# Patient Record
Sex: FEMALE | Race: WHITE | Employment: FULL TIME | URBAN - METROPOLITAN AREA
[De-identification: names, ages, dates, MRNs, and addresses within clinical notes are randomized per-mention and may not be internally consistent; named-entity substitution may affect disease eponyms.]

---

## 2021-05-19 ENCOUNTER — OFFICE VISIT (OUTPATIENT)
Dept: NEUROSURGERY | Age: 26
End: 2021-05-19
Payer: COMMERCIAL

## 2021-05-19 VITALS
HEART RATE: 89 BPM | WEIGHT: 135 LBS | OXYGEN SATURATION: 99 % | BODY MASS INDEX: 23.92 KG/M2 | SYSTOLIC BLOOD PRESSURE: 110 MMHG | HEIGHT: 63 IN | DIASTOLIC BLOOD PRESSURE: 62 MMHG

## 2021-05-19 DIAGNOSIS — G93.5 CHIARI I MALFORMATION (HCC): Primary | ICD-10-CM

## 2021-05-19 DIAGNOSIS — M54.16 LUMBAR RADICULOPATHY: ICD-10-CM

## 2021-05-19 PROCEDURE — 99204 OFFICE O/P NEW MOD 45 MIN: CPT | Performed by: NURSE PRACTITIONER

## 2021-05-19 RX ORDER — LORATADINE 10 MG/1
10 TABLET ORAL DAILY
COMMUNITY

## 2021-05-19 RX ORDER — CETIRIZINE HYDROCHLORIDE 10 MG/1
10 TABLET ORAL PRN
COMMUNITY

## 2021-05-19 RX ORDER — GABAPENTIN 300 MG/1
300 CAPSULE ORAL NIGHTLY
COMMUNITY
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
## 2021-07-22 ENCOUNTER — INITIAL CONSULT (OUTPATIENT)
Dept: PAIN MANAGEMENT | Age: 26
End: 2021-07-22
Payer: COMMERCIAL

## 2021-07-22 VITALS
BODY MASS INDEX: 23.05 KG/M2 | SYSTOLIC BLOOD PRESSURE: 129 MMHG | HEIGHT: 64 IN | WEIGHT: 135 LBS | DIASTOLIC BLOOD PRESSURE: 74 MMHG

## 2021-07-22 DIAGNOSIS — M54.16 LUMBAR RADICULOPATHY: Primary | ICD-10-CM

## 2021-07-22 PROCEDURE — 99204 OFFICE O/P NEW MOD 45 MIN: CPT | Performed by: PAIN MEDICINE

## 2021-07-22 RX ORDER — TIZANIDINE 4 MG/1
4 TABLET ORAL EVERY 6 HOURS PRN
COMMUNITY

## 2021-07-22 ASSESSMENT — ENCOUNTER SYMPTOMS
BOWEL INCONTINENCE: 0
BACK PAIN: 1
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
## 2021-08-11 ENCOUNTER — HOSPITAL ENCOUNTER (OUTPATIENT)
Dept: MRI IMAGING | Age: 26
Discharge: HOME | End: 2021-08-13
Payer: COMMERCIAL

## 2021-08-11 DIAGNOSIS — G93.5 CHIARI I MALFORMATION (HCC): ICD-10-CM

## 2021-08-11 PROCEDURE — 70551 MRI BRAIN STEM W/O DYE: CPT

## 2021-08-20 ENCOUNTER — OFFICE VISIT (OUTPATIENT)
Dept: NEUROSURGERY | Age: 26
End: 2021-08-20
Payer: COMMERCIAL

## 2021-08-20 VITALS
OXYGEN SATURATION: 99 % | HEART RATE: 73 BPM | DIASTOLIC BLOOD PRESSURE: 64 MMHG | HEIGHT: 64 IN | BODY MASS INDEX: 23.05 KG/M2 | SYSTOLIC BLOOD PRESSURE: 104 MMHG | WEIGHT: 135 LBS

## 2021-08-20 DIAGNOSIS — H53.8 BLURRED VISION: Primary | ICD-10-CM

## 2021-08-20 DIAGNOSIS — R51.9 INTRACTABLE EPISODIC HEADACHE, UNSPECIFIED HEADACHE TYPE: ICD-10-CM

## 2021-08-20 DIAGNOSIS — Q04.8 CEREBELLAR TONSILLAR ECTOPIA (HCC): ICD-10-CM

## 2021-08-20 PROCEDURE — 99214 OFFICE O/P EST MOD 30 MIN: CPT | Performed by: NURSE PRACTITIONER

## 2021-08-20 NOTE — PROGRESS NOTES
Dee Benjamin  [facility] [place] [address] [id number]  Dept: [phone]    Patient:  Rodrigo Ramos  YOB: 1995  Date: 21    The patient is a 32 y.o. female who presents today for consult of the following problems:     Chief Complaint   Patient presents with    Follow-up         HPI:     Rodrigo Ramos is a 32 y.o. female who presents for follow-up of headaches. Patient has had ongoing extensive evaluation for posterior/occipital headaches, was following with [facility] clinic for a period of time. Was having very severe headaches that were worsened with sneezing. Did undergo lumbar puncture, was found to have opening pressure of 11. Was evaluated by ophthalmologist, was told no abnormalities were identified. Did also have findings consistent with Chiari I malformation including 5 mm tonsillar descent. The only thing that had helped with her headaches was a trial of Diamox, which she is not on. Headaches have actually improved temporarily for period of time. Reports that she did recently have a worsening of headache approximately 2 weeks ago after attending a concert. Does report associated phonophobia, some less severe photophobia. Does have some occasional left-sided vision changes. Has not established with a local neurologist as of yet. Presents today for review of CSF flow study. Groin pain: No  Saddle anesthesia: No  Hip Pain: No  Bowel/bladder incontinence: No  Constipation or Urinary retention: No    LIMITATIONS    Pain significantly limiting from a daily standpoint. Assistive devices: none  Daily pharmacologic pain control include: gabapentin 300mg, zanaflex 4mg  Back versus le/50    MANAGEMENT     Prior Surgery: No  Prior to 1yr ago:   In the last year:    Physical Therapy: Yes-[facility]-12 weeks aquatic therapy   Chiropractic Interventions: No   Injections: No  Improvement: n/a    Types of injections/responses: n/a    History:     Past Medical History:   Diagnosis Date    Chiari I malformation ([address]     Low back pain      Past Surgical History:   Procedure Laterality Date    TONSILLECTOMY AND ADENOIDECTOMY       History reviewed. No pertinent family history. Current Outpatient Medications on File Prior to Visit   Medication Sig Dispense Refill    levonorgestrel (MIRENA, 52 MG,) IUD 52 mg 1 each by Intrauterine route once      tiZANidine (ZANAFLEX) 4 MG tablet Take 4 mg by mouth every 6 hours as needed      gabapentin (NEURONTIN) 300 MG capsule Take 300 mg by mouth nightly.  cetirizine (ZYRTEC) 10 MG tablet Take 10 mg by mouth as needed for Allergies      loratadine (CLARITIN) 10 MG tablet Take 10 mg by mouth daily (Patient not taking: Reported on 5/19/2021)       No current facility-administered medications on file prior to visit. Social History     Tobacco Use    Smoking status: Never Smoker    Smokeless tobacco: Never Used   Substance Use Topics    Alcohol use: Yes     Comment: rarely    Drug use: Never       Allergies   Allergen Reactions    Hydromorphone Nausea And Vomiting     Pt had Nausea /Vomitinglasted 2 days       Review of Systems  Constitutional: Negative for activity change and appetite change. HENT: Negative for ear pain and facial swelling. Eyes: Negative for discharge and itching. Respiratory: Negative for choking and chest tightness. Cardiovascular: Negative for chest pain and leg swelling. Gastrointestinal: Negative for nausea and abdominal pain. Endocrine: Negative for cold intolerance and heat intolerance. Genitourinary: Negative for frequency and flank pain. Musculoskeletal: Negative for myalgias and joint swelling. Skin: Negative for rash and wound. Allergic/Immunologic: Negative for environmental allergies and food allergies. Hematological: Negative for adenopathy. Does not bruise/bleed easily.    Psychiatric/Behavioral: Negative for self-injury. The patient is not nervous/anxious. Physical Exam:      /64   Pulse 73   Ht 5' 4\" (1.626 m)   Wt 135 lb (61.2 kg)   SpO2 99%   BMI 23.17 kg/m²   Estimated body mass index is 23.17 kg/m² as calculated from the following:    Height as of this encounter: 5' 4\" (1.626 m). Weight as of this encounter: 135 lb (61.2 kg). General:  Gvaiota Lockett is a 32y.o. year old female who appears her stated age. HEENT: Normocephalic atraumatic. Neck supple. Chest: regular rate; pulses equal  Abdomen: Soft nontender nondistended.    Ext: DP and PT pulses 2+, good cap refill  Neuro    Mentation  Appropriate affect  Registration intact  Orientation intact    Cranial Nerves:   Pupils equal and reactive to light  Extraocular motion intact  Face and shrug symmetric  Tongue midline  No dysarthria  v1-3 sensation symmetric, masseter tone symmetric  Hearing symmetric and intact    Sensation: Intact    Motor  L deltoid 5/5; R deltoid 5/5  L biceps 5/5; R biceps 5/5  L triceps 5/5; R triceps 5/5  L wrist extension 5/5; R wrist extension 5/5  L intrinsics 5/5; R intrinsics 5/5     L iliopsoas 5/5 , R iliopsoas 5/5  L quadriceps 5/5; R quadriceps 5/5  L Dorsiflexion 5/5; R dorsiflexion 5/5  L Plantarflexion 5/5; R plantarflexion 5/5  L EHL 5/5; R EHL 5/5    Reflexes  L Brachioradialis 2+/4; R brachioradialis 2+/4  L Biceps 2+/4; R Biceps 2+/4  L Triceps 2+/4; R Triceps 2+/4  L Patellar 2+/4: R Patellar 2+/4  L Achilles 2+/4; R Achilles 2+/4    hoffmans L: neg  hoffmans R: neg  Clonus L: neg  Clonus R: neg  Babinski L: neg  Babinski R: neg    MARIN: Negative  Straight leg raise: Positive  SI joint tenderness: Negative    Studies Review:     MRI CSF flow study 8/11/2021:  FINDINGS:   CSF flow images were obtained in the axial and sagittal planes.       There is normal to and fro CSF flow through the foramen of magnum, both   ventrally and dorsally.           MRI cervical spine 9/2/2020:  IMPRESSION:     Normal morphology and signal intensity of the visualized spinal cord.     Negative for syrinx involving cervical and thoracic spine.  CSF flow   study as discussed. Mild degenerative changes of the cervical spine as discussed.  No   significant spinal canal or significant foraminal stenosis throughout the   cervical and lumbar spine. Cervical Anatomic Variant:  None.  Assume 7 cervical vertebrae with   counting from the craniocervical junction. [place] clinic notes reviewed    Assessment and Plan:      1. Blurred vision    2. Intractable episodic headache, unspecified headache type    3. Cerebellar tonsillar ectopia (HCC)          Plan: Reviewed CSF flow study showing no evidence of obstruction. Given left sided vision changes, recommend new evaluation by neuro-ophthalmologist, referral provided. Also recommend that the patient establish with a local neurologist for further management of headaches, potentially atypical migraines. Patient would like to follow-up a little closer to home, so she will check with her insurance company to find covered neurologists close to her and notify this office with desired referral.  We will follow up in 4-6 weeks to ensure that she has established with the appropriate specialists. Continue to monitor symptoms in the interim. Followup: Return in about 6 weeks (around 10/1/2021), or if symptoms worsen or fail to improve. Prescriptions Ordered:  No orders of the defined types were placed in this encounter.        Orders Placed:  Orders Placed This Encounter   Procedures    External Referral To Ophthalmology     Referral Priority:   Routine     Referral Type:   Eval and Treat     Referral Reason:   Specialty Services Required     Referred to Provider:   Phyllis Johnson MD     Requested Specialty:   Ophthalmology     Number of Visits Requested:   1        Electronically signed by SHAYY Carbajal CNP on 8/20/2021 at 4:28 PM    Please note that this chart was generated using voice recognition Dragon dictation software. Although every effort was made to ensure the accuracy of this automated transcription, some errors in transcription may have occurred.

## 2023-11-29 ENCOUNTER — HOSPITAL ENCOUNTER
Dept: HOSPITAL 101 - LAB | Age: 28
Discharge: HOME | End: 2023-11-29
Payer: COMMERCIAL

## 2023-11-29 DIAGNOSIS — Z11.8: ICD-10-CM

## 2023-11-29 DIAGNOSIS — Z20.822: Primary | ICD-10-CM

## 2023-11-29 LAB — SARS-COV-2 AG: NEGATIVE

## 2023-11-29 PROCEDURE — 87635 SARS-COV-2 COVID-19 AMP PRB: CPT

## 2023-11-29 PROCEDURE — 87811 SARS-COV-2 COVID19 W/OPTIC: CPT

## 2023-11-29 PROCEDURE — 87804 INFLUENZA ASSAY W/OPTIC: CPT

## 2023-11-30 LAB — SARS-COV-2 NAA: NOT DETECTED

## 2024-08-07 ENCOUNTER — HOSPITAL ENCOUNTER (EMERGENCY)
Age: 29
Discharge: HOME | End: 2024-08-07
Payer: COMMERCIAL

## 2024-08-07 VITALS
DIASTOLIC BLOOD PRESSURE: 90 MMHG | SYSTOLIC BLOOD PRESSURE: 139 MMHG | HEART RATE: 90 BPM | OXYGEN SATURATION: 99 % | TEMPERATURE: 98.78 F

## 2024-08-07 VITALS — HEART RATE: 74 BPM | OXYGEN SATURATION: 99 %

## 2024-08-07 VITALS — BODY MASS INDEX: 26.9 KG/M2

## 2024-08-07 DIAGNOSIS — L03.022: Primary | ICD-10-CM

## 2024-08-07 PROCEDURE — 99284 EMERGENCY DEPT VISIT MOD MDM: CPT

## 2024-08-07 PROCEDURE — 96365 THER/PROPH/DIAG IV INF INIT: CPT

## 2024-08-07 NOTE — ED_ITS
<Statement entered by Fredy Wilkes MD - 08/07/24 19:00>    
This documentation has been reviewed and approved.    
    
Chart was sent to my inbox for administrative and group management purposes. I   
was the attending physicians working during the patients hospital course. The   
patient was seen and managed independently by the MLP.  I did not personally see  
or evaluate this patient, nor was I involved in the patient medical decision   
making process or plans of care. Pt was dispositioned by the MLP with complete   
independence and I was not involved in planning, and am reviewing this chart at   
a later date, at a point where changing the pts disposition and treatment would   
not be possible. I was available for consultation should the MLP request during   
this patients ED stay.    
    
HPI    
HPI - General Adult    
General    
Chief complaint: Extremity Problem, Nontraumatic    
Stated complaint: Upper Extremity Pain    
Time Seen by Provider: 08/07/24 17:27    
Source: patient    
Mode of arrival: walk-in    
History of Present Illness    
HPI narrative:     
29-year-old female presents to the emergency department with complaint of   
infection.  States onset this past Saturday to her left thumb and has been   
progressively worsening.  Now noticing a streak going up her arm.  Was evaluated  
by her provider today and sent in for IV antibiotics.  Patient states that there  
was an increasing painful, swollen area around her thumb which she was able to   
cope and get some drainage last night.  Denies any fever, chills, past medical   
problems.    
Quality:?as above    
Severity:?mild    
Timing:?as above, constant, worsening    
Context: Normal setting and activity?    
Modifying factors:?pain with palpation    
Associated symptoms: as above    
    
Related Data    
                                  Previous Rx's    
    
    
    
?Medication ?Instructions ?Recorded    
     
cephalexin 500 mg capsule 500 mg PO QID 10 days #40 caps 08/07/24    
    
    
    
                                    Allergies    
    
    
    
Allergy/AdvReac Type Severity Reaction Status Date / Time    
     
No Known Drug Allergies Allergy   Verified 08/07/24 17:25    
    
    
    
    
Opioid HPI    
Opioid Management    
Most Recent Opioid Data:     
    
    
                                        No Data to Display    
    
    
    
Review of Systems    
    
    
ROS      
    
 Constitutional Denies: fever, fatigue or malaise       
    
 Musculoskeletal Reports: extremity pain       
    
 Integumentary/Breast Reports: redness, skin tenderness and skin swelling       
    
 Neurological Denies: numbness in extremities or weakness in extremities       
    
 Endocrine Denies: fatigue or other (wound)       
    
    
Exam    
Constitutional    
Vital Signs, click to edit/add:     
    
                                Last Vital Signs    
    
    
    
Temp  98.8 F   08/07/24 17:22    
     
Pulse  74   08/07/24 18:32    
     
Resp  18   08/07/24 18:32    
     
BP  139/90   08/07/24 17:22    
     
Pulse Ox  99   08/07/24 18:32    
     
O2 Del Method  Room Air  08/07/24 18:32    
    
    
    
    
Documenting provider has reviewed patient's vital signs: yes    
Common normals: no apparent distress and oriented x3    
General appearance: well developed    
Cardio    
Peripheral pulses: radial pulses present left 2+    
Extremity    
Other:     
Left arm: +tenderness to the cuticle with associated swelling, consistent with   
paronychia.  No fluctuance is noted.  Swelling is firm.  She has lymphangitic   
streaking going up into the biceps region.? No tenderness to the remainder of   
the arm.? No ecchymosis, crepitus, deformity, instability, warmth.? ROM of the   
left upper extremity, hand, fingers is full.? Strength 5/5    
Neuro    
Common normals: oriented x3, no focal motor deficits and no sensory deficits   
noted    
Psych    
Common normals: mental status grossly normal and thought process normal    
Thought process: normal thought process    
    
Course    
Vital Signs    
Vital signs:     
    
                                   Vital Signs    
    
    
    
Temperature  98.8 F   08/07/24 17:22    
     
Pulse Rate  90   08/07/24 17:22    
     
Respiratory Rate  16   08/07/24 17:22    
     
Blood Pressure  139/90   08/07/24 17:22    
     
Pulse Oximetry  99   08/07/24 17:22    
     
Oxygen Delivery Method  Room Air  08/07/24 17:22    
    
    
                                            
    
    
    
Temperature  98.8 F   08/07/24 17:22    
     
Pulse Rate  74   08/07/24 18:32    
     
Respiratory Rate  18   08/07/24 18:32    
     
Blood Pressure  139/90   08/07/24 17:22    
     
Pulse Oximetry  99   08/07/24 18:32    
     
Oxygen Delivery Method  Room Air  08/07/24 18:32    
    
    
    
    
    
Medical Decision Making    
[facility] Narrative    
Medical decision making narrative:     
This is a pleasant 29-year-old female who presents to the emergency department   
with concern about infection    
On arrival, afebrile, vital signs are stable.    
Exam, nontoxic, well-appearing patient in no distress.  She has paronychia to   
her left thumb with erythema, streaking consistent with lymphangitis going up   
into the biceps region.  Neurovascularly intact.  No motor deficits noted    
    
Favor paronychia with lymphangitis    
Abscess less likely based on history and physical exam    
    
Patient was given IV dose of Ancef, 2 g    
    
    
Disposition    
?    
The patient was discharged.    
    
Plan: Patient will be discharged to home.  Condition at time of disposition:   
stable    
Prescription for Keflex sent to her pharmacy    
Advised to follow up with primary provider.   Advised to return for any   
worsening and/or development of new, concerning signs or symptoms    
    
    
PLEASE NOTE: Portions of the medical record may have been produced using   
electronic transcription and may contain errors with respect to translation of   
words which may not have been identified prior to finalization of the chart.    
Medical Records    
Medical records reviewed: Yes I reviewed the patient's medical records    
    
Discharge Plan    
Discharge    
Stand Alone Forms:  Portal Instructions    
    
Chief Complaint: Extremity Problem, Nontraumatic    
    
Clinical Impression:    
 Paronychia of left thumb, Lymphangitis    
    
    
Patient Disposition: Home, Self-Care    
    
Time of Disposition Decision: 18:28    
    
Condition: Good    
    
Mode of Transportation: Private Vehicle    
    
Prescriptions / Home Meds:    
New    
  cephalexin 500 mg capsule     
   500 mg PO QID 10 Days Qty: 40 0RF    
    
Print Language: English    
    
Instructions:  Paronychia (ED), Lymphangitis (ED)    
    
Referrals:    
Sandy Quintanilla NP [Primary Care Provider] - 1 week    
    
Discharge Date/Time: 08/07/24 18:33

## 2024-08-07 NOTE — ED.GENADUL1
HPI
HPI - General Adult
General
Chief complaint: Extremity Problem, Nontraumatic
Stated complaint: Upper Extremity Pain
Time Seen by Provider: 08/07/24 17:27
Source: patient
Mode of arrival: walk-in
History of Present Illness
HPI narrative: 
29-year-old female presents to the emergency department with complaint of infection.  States onset this past Saturday to her left thumb and has been progressively worsening.  Now noticing a streak going up her arm.  Was evaluated by her provider 
today and sent in for IV antibiotics.  Patient states that there was an increasing painful, swollen area around her thumb which she was able to cope and get some drainage last night.  Denies any fever, chills, past medical problems.
Quality:?as above
Severity:?mild
Timing:?as above, constant, worsening
Context: Normal setting and activity?
Modifying factors:?pain with palpation
Associated symptoms: as above

Related Data
Previous Rx's

?Medication ?Instructions ?Recorded
cephalexin 500 mg capsule 500 mg PO QID 10 days #40 caps 08/07/24


Allergies

Allergy/AdvReac Type Severity Reaction Status Date / Time
No Known Drug Allergies Allergy   Verified 08/07/24 17:25



Opioid HPI
Opioid Management
Most Recent Opioid Data: 
      No Data to Display


Review of Systems
ROS  
 Constitutional Denies: fever, fatigue or malaise   
 Musculoskeletal Reports: extremity pain   
 Integumentary/Breast Reports: redness, skin tenderness and skin swelling   
 Neurological Denies: numbness in extremities or weakness in extremities   
 Endocrine Denies: fatigue or other (wound)   

Exam
Constitutional
Vital Signs, click to edit/add: 

Last Vital Signs

Temp  98.8 F   08/07/24 17:22
Pulse  74   08/07/24 18:32
Resp  18   08/07/24 18:32
BP  139/90   08/07/24 17:22
Pulse Ox  99   08/07/24 18:32
O2 Del Method  Room Air  08/07/24 18:32



Documenting provider has reviewed patient's vital signs: yes
Common normals: no apparent distress and oriented x3
General appearance: well developed
Cardio
Peripheral pulses: radial pulses present left 2+
Extremity
Other: 
Left arm: +tenderness to the cuticle with associated swelling, consistent with paronychia.  No fluctuance is noted.  Swelling is firm.  She has lymphangitic streaking going up into the biceps region.? No tenderness to the remainder of the arm.? No 
ecchymosis, crepitus, deformity, instability, warmth.? ROM of the left upper extremity, hand, fingers is full.? Strength 5/5
Neuro
Common normals: oriented x3, no focal motor deficits and no sensory deficits noted
Psych
Common normals: mental status grossly normal and thought process normal
Thought process: normal thought process

Course
Vital Signs
Vital signs: 

Vital Signs

Temperature  98.8 F   08/07/24 17:22
Pulse Rate  90   08/07/24 17:22
Respiratory Rate  16   08/07/24 17:22
Blood Pressure  139/90   08/07/24 17:22
Pulse Oximetry  99   08/07/24 17:22
Oxygen Delivery Method  Room Air  08/07/24 17:22



Temperature  98.8 F   08/07/24 17:22
Pulse Rate  74   08/07/24 18:32
Respiratory Rate  18   08/07/24 18:32
Blood Pressure  139/90   08/07/24 17:22
Pulse Oximetry  99   08/07/24 18:32
Oxygen Delivery Method  Room Air  08/07/24 18:32




Medical Decision Making
[facility] Narrative
Medical decision making narrative: 
This is a pleasant 29-year-old female who presents to the emergency department with concern about infection
On arrival, afebrile, vital signs are stable.
Exam, nontoxic, well-appearing patient in no distress.  She has paronychia to her left thumb with erythema, streaking consistent with lymphangitis going up into the biceps region.  Neurovascularly intact.  No motor deficits noted

Favor paronychia with lymphangitis
Abscess less likely based on history and physical exam

Patient was given IV dose of Ancef, 2 g


Disposition
?
The patient was discharged.

Plan: Patient will be discharged to home.  Condition at time of disposition: stable
Prescription for Keflex sent to her pharmacy
Advised to follow up with primary provider.   Advised to return for any worsening and/or development of new, concerning signs or symptoms


PLEASE NOTE: Portions of the medical record may have been produced using electronic transcription and may contain errors with respect to translation of words which may not have been identified prior to finalization of the chart.
Medical Records
Medical records reviewed: Yes I reviewed the patient's medical records

Discharge Plan
Discharge
Stand Alone Forms:  Portal Instructions

Chief Complaint: Extremity Problem, Nontraumatic

Clinical Impression:
 Paronychia of left thumb, Lymphangitis


Patient Disposition: Home, Self-Care

Time of Disposition Decision: 18:28

Condition: Good

Mode of Transportation: Private Vehicle

Prescriptions / Home Meds:
New
  cephalexin 500 mg capsule 
   500 mg PO QID 10 Days Qty: 40 0RF

Print Language: English

Instructions:  Paronychia (ED), Lymphangitis (ED)

Referrals:
Sandy Quintanilla NP [Primary Care Provider] - 1 week

Discharge Date/Time: 08/07/24 18:33

## 2024-08-07 NOTE — XMS_ITS
Comprehensive CCD (C-CDA v2.1)  
  
                           Created on: 2024  
  
  
Lissett Ba  
External Reference #: CDR,PersonID:[id number]  
: 1995  
Sex: Female  
  
Demographics  
  
  
                                        Address             [address] [place]  [address]  
   
                                        Mobile Phone        [phone]  
   
                                        Preferred Language  en  
   
                                        Marital Status      Single  
   
                                        Restorationist Affiliation Unknown  
   
                                        Race                White  
   
                                        Ethnic Group        Not  or Lati  
no  
  
  
Author  
  
  
                                        Organization        [facility]  
  
  
Care Team Providers  
  
  
                                Care Team Member Name Role            Phone  
   
                                Sandy Quintanilla Primary Care Provider [phone]  
   
                                YAW GUPTA  Referring       Unavailable  
   
                                SANDY QUINTANILLA Primary Care    Unavailable  
   
                                AICHHOLKAMRYN, CNP SANDY Admitting       Unavailable  
   
                                AICHHOLZ, CNP SANYD Attending       Unavailable  
   
                                AICHHOLZ, CNP SANDY Primary Care    Unavailable  
   
                                AICHHOLZ, CNP SANDY Consulting      Unavailable  
   
                                AICHHOLZ, CNP SANDY Admitting       Unavailable  
   
                                AICHHOLZ, CNP SANDY Attending       Unavailable  
   
                                AICHHOLZ, CNP SANDY Primary Care    Unavailable  
   
                                AICHHOLZ, CNP SANDY Consulting      Unavailable  
   
                                Unavailable     Primary Care Provider Unavailabl  
e  
  
  
  
Allergies  
  
  
                                                    Allergy   
Classification                          Reported   
Allergen(s)               Allergy Type              Date of   
Onset                     Reaction(s)               Facility  
   
                                                    Opioid Agonists  
(1 source)          HYDROmorphone       Drug Allergy          
0                                       Nausea And   
Vomiting                                [facility]  
  
  
  
Medications  
Current Medications  
  
  
  
                      Medication Drug Class(es) Dates      Sig (Normalized) Sig   
(Original)  
   
                                                    cetirizine   
hydrochloride 10 mg   
oral tablet  
(1 source)                              Histamine-1   
Receptor Antagonist                                         cetirizine   
(ZYRTEC) 10 MG   
tablet Take 10 mg   
by mouth as   
needed for   
Allergies 0   
Active  
   
                                                    dextromethorphan   
hydrobromide 15 mg /   
guaiFENesin 400 mg /   
pseudoephedrine   
hydrochloride 60 mg   
oral tablet  
(1 source)                              alpha-Adrenergic   
Agonist,   
Uncompetitive   
N-methyl-D-aspartat  
e Receptor   
Antagonist, Sigma-1   
Agonist                                 Start:   
2024                              take 4 tablets by   
mouth every   
twenty-four hours                       Pseudoephedrine-D  
m-Guaifenesin   
(Capmist Dm)   
60- mg   
tablet Active 1   
TAB PO EVERY 4-6   
HOURS 30 May   
17th, 2024   
12:00am do not   
exceed 4 doses   
per 24 hrs  
   
                                                    gabapentin 300 mg oral   
capsule  
(1 source)                              Anti-epileptic   
Agent                                               take 1 capsule by   
mouth once daily                        gabapentin   
(NEURONTIN) 300   
MG capsule Take   
300 mg by mouth   
nightly. 0 Active  
   
                                                    loratadine 10 mg oral   
tablet  
(1 source)                                                  take 1 tablet by   
mouth once daily                        loratadine   
(CLARITIN) 10 MG   
tablet Take 10 mg   
by mouth daily 0   
Active  
   
                                                    tiZANidine 4 mg oral   
tablet  
(1 source)                              Central alpha-2   
Adrenergic Agonist                                  take 1 tablet by   
mouth every six   
hours as needed                         tiZANidine   
(ZANAFLEX) 4 MG   
tablet Take 4 mg   
by mouth every 6   
hours as needed 0   
Active  
   
                                                    valACYclovir 1000 mg   
oral tablet  
(1 source)                              Herpesvirus   
Nucleoside Analog   
DNA Polymerase   
Inhibitor, Herpes   
Simplex Virus   
Nucleoside Analog   
DNA Polymerase   
Inhibitor, Herpes   
Zoster Virus   
Nucleoside Analog   
DNA Polymerase   
Inhibitor                               Start:   
02-  
End:   
2024                              take 2 tablets by   
mouth in the   
morning                                 valACYclovir   
(Valtrex) 1 g   
tablet   
Indications: Cold   
sore Take 2   
tablets (2,000   
mg) by mouth in   
the morning and 2   
tablets (2,000   
mg) before   
bedtime. Do all   
this for 1 day. 4   
tablet 2   
02/15/2024   
2024 Active  
  
  
  
Completed/Discontinued Medications  
  
  
  
                      Medication Drug Class(es) Dates      Sig (Normalized) Sig   
(Original)  
   
                                                    acetaZOLAMIDE 250 mg   
oral tablet  
(1 source)                              Carbonic Anhydrase   
Inhibitor                               Start:   
2020                              take 1 tablet by   
mouth twice daily                       acetaZOLAMIDE   
(DIAMOX) 250 mg   
tablet Take 1   
tablet by mouth   
twice daily. 60   
tablet 1 2020   
Active  
   
                                        Comment on above:   Take 1 tablet by shawna  
th twice daily.   
   
                                                    cephalexin 500 mg   
oral capsule  
(1 source)                              Cephalosporin   
Antibacterial                                       take 1 capsule by   
mouth once daily                        cephALEXin (KEFLEX)   
500 mg capsule Take   
500 mg by mouth   
once daily. 0   
Active  
   
                                        Comment on above:   Take 500 mg by mouth  
 once daily.   
   
                                                    ketorolac   
tromethamine 10 mg   
oral tablet  
(1 source)                              Nonsteroidal   
Anti-inflammatory   
Drug,   
Cyclooxygenase   
Inhibitor                                           take 1 tablet by   
mouth every eight   
hours as needed                         ketorolac (TORADOL)   
10 mg tablet Take   
10 mg by mouth   
every 8 hours as   
needed. 0 Active  
   
                                        Comment on above:   Take 10 mg by mouth   
every 8 hours as needed.   
   
                                                    ondansetron 4 mg   
disintegrating oral   
tablet  
(1 source)                              Serotonin-3   
Receptor Antagonist                                 take 1 tablet by   
mouth every eight   
hours as needed                         ondansetron orally   
disintegrating   
(ZOFRAN ODT) 4 mg   
disintegrating   
tablet Take 4 mg by   
mouth every 8 hours   
as needed. 0 Active  
   
                                        Comment on above:   Take 4 mg by mouth e  
very 8 hours as needed.   
   
                                                    24 hr oxybutynin   
chloride 10 mg   
extended release   
oral tablet  
(1 source)                              Cholinergic   
Muscarinic   
Antagonist                                          take 1 tablet by   
mouth once daily                        oxybutynin ER   
(DITROPAN XL) 10 mg   
24 hr tablet Take   
10 mg by mouth once   
daily. 0 Active  
   
                                        Comment on above:   Take 10 mg by mouth   
once daily.   
  
  
  
Problems  
Active Problems  
  
  
                      Problem Classification Problem    Date       Documented Da  
te Episodic/Chronic  
   
                                                    Other nervous system   
disorders  
(1 source)                              Benign intracranial   
hypertension;   
Translations:   
[Benign   
intracranial   
hypertension]                                               Chronic  
   
                                                    Other nervous system   
disorders  
(2 sources)                             Chiari malformation   
type I;   
Translations:   
[Compression of   
brain]                                                      Chronic  
   
                                                    Other upper   
respiratory infections  
(1 source)                              Acute pharyngitis,   
unspecified;   
Translations:   
[Acute pharyngitis]                     2024          Episodic  
   
                                                    Unclassified  
(3 sources)                             CONTACT W/AND   
(SUSP) EXPOS   
COVID-19;   
Translations:   
[CONTACT W/AND   
(SUSP) EXPOS   
COVID-19]                               Onset:   
2022                                            
   
                                                    Unclassified  
(1 source)                              Chiari   
malformation;   
Translations:   
[Chiari   
malformation]                           2024            
   
                                                    Viral infection  
(2 sources)                             Herpes labialis;   
Translations:   
[Herpesviral   
vesicular   
dermatitis]                             Onset:   
02-                02-                Episodic  
   
                                                    Viral infection  
(1 source)                              COVID-19;   
Translations:   
[COVID-19]                              Onset:   
2021                                            
  
  
Past or Other Problems  
  
  
                      Problem Classification Problem    Date       Documented Da  
te Episodic/Chronic  
   
                                                    Unclassified  
(1 source)                              CONTACT W/AND   
(SUSP) EXPOS   
COVID-19;   
Translations:   
[CONTACT W/AND   
(SUSP) EXPOS   
COVID-19]                               Onset:   
2022                                            
  
  
  
Results  
  
  
                          Test Name    Value        Interpretation Reference   
Range                                   Facility  
   
                                                    No Panel InformationOrdered   
By: Diane Hernandez on 2024   
   
                      Quick Strep (POC)                                  [facility]  
   
                                                    Covid-19 PCR ([facility])on    
   
                                                    SARS-CoV-2 (COVID-19)   
RNA CLIVE+probe Ql (Unsp   
spec)           Not detected    Normal          NOT DETECTED    [facility]  
   
                                        Comment on above:   Result Comment: This  
 test is not yet approved or cleared by   
the   
[place] FDA. When there are no FDA-approved or cleared   
tests available, and other criteria are met, FDA can make tests   
available under an emergency access mechanism called an   
Emergency Use Authorization (EUA). The EUA for this test is   
supported by the [place] of Health and Human Service's   
(HHS's) declaration that circumstances exist to justify the   
emergency use of in vitro diagnostics for the detection and/or   
diagnosis of the virus that causes COVID-19. This EUA will   
remain in effect (meaning this test can be used) for the   
duration of the COVID-19 declaration justifying emergency of   
IVDs, unless it is terminated or revoked by FDA (after which   
the test may no longer be used).  
When diagnostic testing is negative, the possibility of a false   
negative should be considered in  
the context of a patient's recent exposures and the presence of   
clinical signs and symptoms  
consistent with SARS-CoV-2.   
   
                                                            Performed By: #### C  
VDTB ####  
[facility]  
[address] [place] [address]  
Dr. Jennifer Porras   
   
                                                    Covid-19 PCR ([facility])on    
   
                                                    SARS-CoV-2 (COVID-19)   
RNA CLIVE+probe Ql (Unsp   
spec)                     Detected                  Critically   
abnormal                  NOT DETECTED              [facility]  
   
                                        Comment on above:   Result Comment: This  
 test is not yet approved or cleared by   
the   
[place] FDA. When there are no FDA-approved or cleared   
tests available, and other criteria are met, FDA can make tests   
available under an emergency access mechanism called an   
Emergency Use Authorization (EUA). The EUA for this test is   
supported by the [place] of Health and Human Service's   
(HHS's) declaration that circumstances exist to justify the   
emergency use of in vitro diagnostics for the detection and/or   
diagnosis of the virus that causes COVID-19. This EUA will   
remain in effect (meaning this test can be used) for the   
duration of the COVID-19 declaration justifying emergency of   
IVDs, unless it is terminated or revoked by FDA (after which   
the test may no longer be used).   
   
                                                            Performed By: #### C  
VDTB ####  
[facility]  
[address] [place] [address]  
Dr. Jennifer Porras   
   
                                                    MRI CSF FLOW STUDYon   
021   
   
                                        MRI CSF FLOW STUDY  EXAMINATION:  
MRI CSF flow study   
2021 1:15 pm  
TECHNIQUE:  
Sagittal and axial T2   
flow  
COMPARISON:  
None.  
HISTORY:  
ORDERING SYSTEM   
PROVIDED HISTORY:   
Chiari I malformation   
(HCC)  
TECHNOLOGIST PROVIDED   
HISTORY:  
evaluate flow; +chiari   
malformation  
Is the patient   
pregnant?->No  
Reason for Exam: Pt   
c/o headaches,   
vertigo, ringing in   
ears, x 2 years,  
heaviness in back of   
head, mother with   
Chiari  
Acuity: Chronic  
Type of Exam: Ongoing  
FINDINGS:  
CSF flow images were   
obtained in the axial   
and sagittal planes.  
There is normal to and   
fro CSF flow through   
the foramen of magnum,   
both  
ventrally and   
dorsally.  
IMPRESSION:  
Normal CSF flow.  
Interpreted by:  
Malcolm Villeda MD  
Signed by:  
Malcolm Villeda MD  
21  
Final result        Normal                                  [facility]  
   
                                                    MRI CSF FLOW STUDYOrdered By  
: Yaw Gupta on 2021   
   
                                 Normal CSF flow.                       [facility]  
Work Phone:   
[phone]  
   
                                                            EXAMINATION: MRI CSF  
   
flow study 2021   
1:15 pm TECHNIQUE:   
Sagittal and axial T2   
flow COMPARISON: None.   
HISTORY: ORDERING   
SYSTEM PROVIDED   
HISTORY: Chiari I   
malformation ([facility])   
TECHNOLOGIST PROVIDED   
HISTORY: evaluate   
flow; +chiari   
malformation Is the   
patient pregnant?->No   
Reason for Exam: Pt   
c/o headaches,   
vertigo, ringing in   
ears, x 2 years,   
heaviness in back of   
head, mother with   
Chiari Acuity: Chronic   
Type of Exam: Ongoing   
FINDINGS: CSF flow   
images were obtained   
in the axial and   
sagittal planes. There   
is normal to and fro   
CSF flow through the   
foramen of magnum,   
both ventrally and   
dorsally.                                                   [employer]  
Work Phone:   
[phone]  
   
                                                            [place], p[place] Incoming   
Radiant Results From   
[employer] [facility] -   
2021 5:00 PM EDT   
Formatting of this   
note might be   
different from the   
original.  
EXAMINATION:  
MRI CSF flow study   
2021 1:15 pm  
  
TECHNIQUE:  
Sagittal and axial T2   
flow  
  
COMPARISON:  
None.  
  
HISTORY:  
ORDERING SYSTEM   
PROVIDED HISTORY:   
Chiari I malformation   
(HCC)  
TECHNOLOGIST PROVIDED   
HISTORY:  
evaluate flow; +chiari   
malformation  
Is the patient   
pregnant?->No  
Reason for Exam: Pt   
c/o headaches,   
vertigo, ringing in   
ears, x 2 years,  
heaviness in back of   
head, mother with   
Chiari  
Acuity: Chronic  
Type of Exam: Ongoing  
  
FINDINGS:  
CSF flow images were   
obtained in the axial   
and sagittal planes.  
  
There is normal to and   
fro CSF flow through   
the foramen of magnum,   
both  
ventrally and   
dorsally.  
  
IMPRESSION:  
Normal CSF flow.  
                                                            [employer]  
Work Phone:   
[phone]  
   
                                                                  [employer]  
Work Phone:   
[phone]  
   
                                                    Provider Letteron 2021  
   
   
                                        Provider Letter     (Inserted Image.   
Unable to display)  
2021  
LISSETT BA  
[address] [address]  
LISSETT BA   
1995  
Dear Lissett ,  
We have been trying to   
reach you since   
 with no   
success. You have an   
appointment with Dr. Uribe on  @   
10:15am which will   
need to be rescheduled   
since he will be out   
of the office that   
day. Please contact   
the office at the   
number listed below to   
get this appointment   
rescheduled at your   
earliest convenience.  
Thank you for your   
prompt attention to   
this matter. Call   
[phone] and   
choose option 1.  
Sincerely,  
Dr Christian Uribe MD  
[facility]  
[address] [place] [address] [facility]   
[place] [address]  
Phone: [phone] [facility]  
   
                                                    RAD - CT Reporton 10-  
   
   
                                        RAD - CT Report     [id number]  
80  
[id number]  
[id number]             [facility]  
   
                                                    Ambulatory Clinical Summaryo  
n 2020   
   
                                                    Ambulatory Clinical   
Summary                                 {[id number]  
[id number]  
[id number]      [facility]  
   
                                                    Consent for Procedure/Surger  
yon 2020   
   
                                                    Consent for   
Procedure/Surgery                       [id number]  
[id number]  
[id number]             [facility]  
   
                                                    Patient Educationon 20  
20   
   
                                        Patient Education   Cystoscopy  
Care After  
Refer to this sheet in   
the next few weeks.   
These instructions   
provide you with   
information on caring   
for yourself after   
your procedure. Your   
caregiver may also   
give you more specific   
instructions. Your   
treatment has been   
planned according to   
current medical   
practices, but   
problems sometimes   
occur. Call your   
caregiver if you have   
any problems or   
questions after your   
procedure.  
(Inserted Image.   
Unable to display)  
HOME CARE INSTRUCTIONS  
Things you can do to   
ease any discomfort   
after your procedure   
include:  
? Drinking enough   
water and fluids to   
keep your urine clear   
or pale yellow.  
? Taking a warm bath   
to relieve any burning   
feelings.  
SEEK IMMEDIATE MEDICAL   
CARE IF:  
? You have an increase   
in blood in your   
urine.  
? You notice blood   
clots in your urine.  
? You have difficulty   
passing urine.  
? You have the chills.  
? You have abdominal   
pain.  
? You have a fever or   
persistent symptoms   
for more than 2?3   
days.  
? You have a fever and   
your symptoms suddenly   
get worse.  
MAKE SURE YOU:  
? Understand these   
instructions.  
? Will watch your   
condition.  
? Will get help right   
away if you are not   
doing well or get   
worse.  
Document Released:   
2006 Document   
Revised: 2013   
Document Reviewed:   
06/10/2013  
ExitCare? Patient   
Information ?   
[employer]      [facility]  
   
                                                    Urology Office/Clinic Noteon  
 2020   
   
                                                    Urology Office/Clinic   
Note                                    Chief Complaint  
Patient is here for   
Cysto, right stent   
removal.  
HPI Staff  
Patient is here for   
Cysto, right stent   
removal. ABX started.   
Scope #1.  
History of Present   
Illness  
I have reviewed and   
verified the staff HPI   
to be accurate for   
this encounter.  
Review of Systems  
PHQ Score  
Initial Depression   
Screen Score: 0  
Physical Exam  
Vitals & Measurements  
HR: 97(Peripheral) RR:   
16 BP: 119/70  
HT: 160 cm HT: 160.0   
cm WT: 65.1 kg WT:   
65.1 kg BMI: 25.43  
Procedure  
Operative Information  
Anesthesia Type: Local  
Procedure: Local   
Cystoscopy with Stent   
Removal  
Complications: None  
Surgical risks,   
benefits, details of   
the procedure have   
been explained to the   
patient.  
Full informed consent   
has been obtained.  
Intraoperative   
Information  
Prepped: Patient is   
placed in modified   
dorso/lithotomy   
position. The patient   
was prepped with the   
Betadine solution.  
Anesthesia: 2%   
Xylocaine Jelly per   
urethra.  
Procedure: Cystoscopy   
and right stent   
removal. The flexible   
Cystoscope was passed   
in retrograde fashion   
into the bladder   
without difficulty.   
The bladder was viewed   
in entirety and found   
to be without tumors   
or stones. Mild   
inflammation was seen   
surrounding the   
orifice with the stent   
seen protruding from   
it. The stent was then   
grasped and removed in   
its entirety.  
Specimens Removed:   
None  
Postoperative   
Information  
The patient tolerated   
the procedure well and   
was subsequently   
discharged home.  
Assessment/Plan  
1. Foreign body of   
bladder (T19.1XXA:   
Foreign body in   
bladder, initial   
encounter)  
Follow-up  
With When Contact   
Information  
Rony Marino MD, [facility]   
In 6 months [address], [place] [address]- [phone]  
Additional   
Instructions: w/kub  
Patient Education  
Cystoscopy, Care After  
I, Nile Johnson,   
personally scribed for   
Dr. Madrigal on   
2020 08:29:21.   
Electronically signed   
by don Johnson on   
2020 08:29:21.  
Documentation recorded   
by the scribe, Nile Johnson,   
accurately reflects   
the services(s) I   
performed and   
decisions made by me.   
Authenticated by Dr. Madrigal on 2020   
08:34:25.  
  
Problem List/Past   
Medical History  
Ongoing  
BMI 26.0-26.9,adult  
History of Chiari   
malformation  
Kidney stone  
Historical  
No qualifying data  
Procedure/Surgical   
History  
Cystoscopy   
(2020),   
Adenoidectomy, Lumbar   
puncture,   
Tonsillectomy.  
Medications  
Cipro, Oral, q12hr,   
Not taking  
Flomax 0.4 mg Cap, 0.4   
mg= 1 cap(s), Oral,   
Daily, 1 refills, Not   
taking  
ketorolac 10 mg Tab,   
10 mg= 1 tab(s), Oral,   
q4hr, PRN, Not taking  
Levsin 0.125 mg oral   
tablet, 0.125 mg= 1   
tab(s), Oral, q6hr, 1   
refills  
Percocet 5 mg-325 mg   
oral tablet, Oral,   
q6hr, Not taking  
Zofran 4 mg Tab, Oral,   
q8hr, Not taking  
Zyrtec-D, Oral, BID  
Allergies  
No Known Allergies  
Social History  
Alcohol - Denies   
Alcohol Use,   
2020  
Substance Abuse -   
Denies Substance   
Abuse, 2020  
Tobacco - No Risk,   
2020  
Never (less than 100   
in lifetime) Tobacco   
Use:. Never Smokeless   
Tobacco Use:.,   
2020  
Family History  
Breast: Grandparent.  
Colon cancer:   
Grandparent.  
Hypertension: Sister.  
Migraine: Sister.   Normal                                  [facility]  
   
                                        Comment on above:   Result Comment: Elec  
tronically Signed By: ANDREW TALLEY, Boby AGUSTIN\.br\Date and Time Signed: 20 08:34   
EDT\.br\Electronically Co-Signed By: Nile Johnson MA.ann\Date and Time Co-Signed: 20 08:29 EDT   
   
                                                    Provider Letter FTon    
   
                                        [place] [facility] [place]ed [place].   
Unable to display)  
2020  
LISSETT BA  
[address] [address]  
MEJIA LISSETT DENNISON   
1995  
To Whom It May   
Concern,  
Please excuse above   
patient from work.  
Date of Illness:  
From: 2020  
To: 2020  
May Return to Work On:   
2020  
Restrictions: Patient   
may return to work   
20 without   
restrictions.  
Comments: Patient is   
having surgical   
procedure done 20  
Sincerely,  
Boby Madrigal M.D., F.A.C.S.      [facility]  
   
                                                    ALLIED HEALTHon 09-   
   
                                        ALLIED HEALTH       HNO ID: [id number]  
Author: Vilma Sibley (Ct)  
Service: ?  
Author Type:   
Technician  
Type: Allied Health  
Filed: 9/10/2020 2:29   
PM  
Note Text:  
Radiology Service   
Progress Note  
PATIENT NAME: Lissett Ba  
MRN: [id number]  
DATE OF SERVICE:   
September 10, 2020  
TIME: 2:29 PM  
PATIENT IDENTITY   
VERIFICATION COMPLETED   
USING TWO (2)   
IDENTIFIERS: Name  
and Date of Birth   
confirmed by patient   
verbally.  
FALL SCREENING: Has   
the patient had 2   
falls in the last year   
or 1 fall  
with injury or   
currently using an   
Ambulatory Assistive   
Device (Walker,  
Cane, Wheelchair,   
Crutches, etc.)? No  
PATIENT GENDER DATA:   
Female. Pregnancy   
status: Pregnant: No  
Breastfeeding status:   
NO.  
PATIENT RELEVANT   
IMPLANT DATA REVIEWED:   
Not Applicable  
RADIOLOGY DEPARTMENT:   
CT; Exam(s) Completed:   
Temporal Bones  
PERIPHERAL IV DATA:   
Not applicable  
SIGNED BY: QIAN Sibley  
September 10, 2020   
2:29 PM             [facility]  
   
                                                    CT TEMP BONES WO IVCONon 09-  
   
   
                                        CT TEMP BONES WO IVCON * * *Final Report  
* * *  
DATE OF EXAM: Sep 10   
2020 2:31PM  
[facility] 0512 - CT TEMP   
BONES WO IVCON /   
ACCESSION # [id number]  
PROCEDURE REASON: rule   
out semicircular canal   
dehiscence  
  
* * * * Physician   
Interpretation * * * *  
EXAMINATION: CT TEMP   
BONES WO IVCON  
CLINICAL HISTORY:   
Concern for   
semicircular canal   
dehiscence.  
Disequilibrium.  
TECHNIQUE: Spiral 0.6   
mm axial scans through   
the temporal bones   
contrast  
in high resolution   
bony algorithm.   
Coronal planar   
reconstructions  
provided.  
MQ: [id number]  
Dose-Length Product   
(DLP): 338 mGy*cm.  
CT Dose Reduction   
Employed: Automated   
exposure control (AEC)  
COMPARISON: None.  
RESULT:  
RIGHT:  
External Auditory   
Canal/Superficial Soft   
Tissues: EAC is   
patent.  
Overlying superficial   
soft tissues and the   
tissues of the   
visualized  
inferotemporal fossa   
are within normal   
limits.  
Mastoid Air Cells and   
Middle Ear Cavities:   
Mastoid air cells and  
middle ear cavities   
are clear.  
Ossicles: Ossicles are   
normally aligned and   
intact.  
Inner Ear Structures:   
There is dehiscence of   
the bony covering   
overlying  
the superior   
semicircular canal   
(series 7 image 160).   
Inner ear  
structures are within   
normal limits. No   
evidence of   
dysmorphism, bony  
destruction or   
dehiscence.  
Internal Auditory   
Canals: IAC is within   
normal limits of   
caliber and  
configuration.  
Skull Base: No   
evidence of bony   
destruction.  
LEFT:  
External Auditory   
Canal/Superficial Soft   
Tissues: EAC is   
patent.  
Overlying superficial   
soft tissues and the   
tissues of the   
visualized  
inferotemporal fossa   
are within normal   
limits.  
Mastoid Air Cells and   
Middle Ear Cavities:   
Mastoid air cells and  
middle ear cavities   
are clear.  
Ossicles: Ossicles are   
normally aligned and   
intact.  
Inner Ear Structures:   
There is marked   
thinning with probable   
dehiscence  
of the left superior   
semicircular canal   
(series 6 image 57).   
Inner ear  
structures are   
otherwise within   
normal limits. No   
evidence of  
dysmorphism, bony   
destruction or   
dehiscence.  
Internal Auditory   
Canals: IAC is within   
normal limits of   
caliber and  
configuration.  
Skull Base: No   
evidence of bony   
destruction.  
 (topogram)   
images: No additional   
findings.  
There are small mucous   
retention cysts in the   
right maxillary   
antrum.  
IMPRESSION:  
Right and left   
superior semicircular   
canal dehiscence as   
outlined.  
Otherwise normal   
appearance of the   
temporal bones.  
: PSCB  
Transcribe Date/Time:   
Sep 10 2020 2:34P  
Dictated by : SIRISHA JACOBSON DO  
This examination was   
interpreted and the   
report reviewed and  
electronically signed   
by:  
SIRISHA JACOBSON DO on   
Sep 10 2020 2:44PM EST  
[id number] Normal                                  [facility]  
   
                                                    Basic Metabolic Panlon    
   
                      Anion gap [Moles/Vol] 11 mmol/L  Normal     9-18       [facility]  
   
                                        Comment on above:   Performed By: #### B  
MP ####[place] [phone]   
   
                      Calcium [Mass/Vol] 8.7 mg/dL  Normal     8.5-10.5   [facility]  
   
                                        Comment on above:   Performed By: #### B  
MP ####[place] [phone]   
   
                      Chloride [Moles/Vol] 110 mmol/L Normal          [facility]  
   
                                        Comment on above:   Performed By: #### B  
MP ####[place] [phone]   
   
                      CO2 [Moles/Vol] 17 mmol/L  Low        23-32      [facility]  
   
                                        Comment on above:   Performed By: #### B  
MP ####[place] [phone]   
   
                      Creatinine [Mass/Vol] 0.63 mg/dL Low        0.70-1.40  [facility]  
   
                                        Comment on above:   Performed By: #### B  
MP ####[place] [phone]   
   
                      eGFR- Amer. >60        Normal     >60        [facility]  
   
                                        Comment on above:   Performed By: #### B  
MP ####[place] [phone]   
   
                                                    GFR/1.73 sq M   
predicted among   
non-blacks MDRD   
(S/P/Bld) [Vol   
rate/Area]      mL/min/{1.73_m2} Normal          >60             [facility]  
   
                                        Comment on above:   Performed By: #### B  
MP ####[place] [phone]   
   
                      Glucose [Mass/Vol] 98 mg/dL   Normal          [facility]  
   
                                        Comment on above:   Performed By: #### B  
MP ####[place] [phone]   
   
                      Potassium [Moles/Vol] 3.8 mmol/L Normal     3.5-5.0    [facility]  
   
                                        Comment on above:   Performed By: #### B  
MP ####[facility] [place], [place] [phone]   
   
                      Sodium [Moles/Vol] 138 mmol/L Normal     132-148    [facility]  
   
                                        Comment on above:   Performed By: #### B  
MP ####[facility] [place], [place] [phone]   
   
                                                    Urea nitrogen   
[Mass/Vol]      12 mg/dL        Normal          8-25            [facility]  
   
                                        Comment on above:   Performed By: #### B  
MP ####[facility] [place], [place] [phone]   
   
                                                    NURSING PROGon 2020   
   
                                        NURSING PROG        HNO ID: [id number]  
Author: Marina ElyRn)   
JERONIMO Irizarry  
Service: Nursing  
Author Type:   
Registered Nurse  
Type: Nursing Progress   
Note  
Filed: 2020 11:26   
AM  
Note Text:  
Nursing Progress Note  
Patient Name: Lissett Ba  
MRN: [id number]  
Patient Location:   
[place] [address]  
______________________  
__  
Daily Note: Patient   
resting in bed. Rates   
frontal and occipital   
H/A a 6/10  
along with nausea.   
Medicated with both   
Fioricet and   
Dramamine. Also stated  
back spasms that feel   
shocking and painful.   
Lung sounds are clear   
on room  
air. Telemetry monitor   
pattern S/B HR 53.   
Poor appetite with   
meals. IVF  
infusing at 100 ml/hr.   
Her father is at   
bedside. S/R up x 2   
call light  
with in reach. Will   
continue to assess.  
This note was   
completed by: Marina Irizarry RN        [facility]  
   
                                        NURSING PROG        HNO ID: [id number]  
Author: Dominga ElyRn)   
JERONIMO Perry  
Service: ?  
Author Type:   
Registered Nurse  
Type: Nursing Progress   
Note  
Filed: 2020 1:37   
AM  
Note Text:  
Nursing Progress Note  
Patient Name: Lissett Ba  
MRN: [id number]  
Patient Location:   
[id number]  
[id number]  
______________________  
__  
Daily Note:2100   
Patient assessed ao-3   
follows command c/o   
head ache and  
dizziness prn meds   
given.  
This note was   
completed by: Dominga Perry Rn          [facility]  
   
                                                    CASE MANAGEMon 2020   
   
                                        CASE MANAGEM        HNO ID: [id number]  
Author: Hemalatha Navarro (Sw)  
Service: Care   
Management  
Author Type: Social   
Worker  
Type: Care Mgt   
Progress Note  
Filed: 2020 6:28   
PM  
Note Text:  
CARE MANAGEMENT   
PROGRESS NOTE  
SERVICE DATE: 2020  
SERVICE TIME: 12:37 PM  
LOS: 2 days  
Needs Prior to   
Discharge: To Be   
Determined  
Pt continues to have   
symptoms, though   
somewhat improved.   
Plan is to follow  
up with   
neurosurgery/Dr. Argueta for repeat MRI   
in November, as well   
as  
for headaches. FIDEL/CHRISTINA   
to continue to follow.  
SIGNATURE: ELIU HARDY PATIENT   
NAME: Lissett Ba  
DATE: 2020 MRN: [id number]  
TIME: 12:37 PM   
PAGER/CONTACT #:   
[phone]        [facility]  
   
                                                    CONSULTon 2020   
   
                                        CONSULT             HNO ID: [id number]  
Author: Christy Day  
Service: Neurology   
General  
Author Type: Physician  
Type: Consults  
Filed: 2020 1:04   
PM  
Note Text:  
INITIAL CONSULT -   
GENERAL NEUROLOGY  
SERVICE DATE: 2020  
SERVICE TIME: 12:40 PM  
Current Attending   
Provider: Yola Moon  
Reason for Evaluation:   
Headaches  
Subjective  
HPI: Ms. Lissett Ba is a 25 year   
old left handed female   
with no  
significant past   
medical history.  
History gathered from   
patient.  
She reports that she   
started having   
headaches for the past   
year that is  
getting worse in the   
past 6 months. These   
are usually located in   
the  
frontal/occipital   
areas, described as   
constant pressure to   
throbbing in  
character, accompanied   
by   
nausea/vomiting/blurre  
d vision/loss of   
balance  
occurring almost   
daily. She was seeing   
a neurologist/NP at   
[place]. Had  
CT brain which   
initially showed some   
abnormality which led   
to the MRI  
which showed a Chiari   
I malformation hence   
referred to   
neurosurgery.  
She established with   
neurosurgery on   
2020. Has had   
multiple  
evaluations ruling out   
IIH (ophthalmology   
evaluation), trial of   
Diamox.  
She had lumbar   
puncture done on   
2020 at [facility]. Since  
2020, noted   
pressure headache   
getting worse with   
accompanying nausea  
and vomiting. She went   
to [facility]   
but was transferred to  
[place] because of   
the need for a blood   
patch. She had a blood   
patch (20  
cc) done on 2020.   
She reports that she   
was significantly   
better after  
the blood patch but   
had to lay flat for an   
MRI of the C and the   
T-spine  
and made her headache   
recur. She was   
followed up by the   
neurosurgery team  
on 9/3/2020,   
recommended a second   
blood patch which she   
received on  
2020. She reports   
this blood patch did   
not help instead   
triggered  
more issues, she   
reportedly had a lot   
of pain during the   
procedure, almost  
blacked out and was   
not able to feel her   
legs. Leg sensation   
have  
returned but reports   
to have tingling   
whenever she moves her   
leg; also has  
a lot of pressure   
sensation in the head   
- ' feels it will   
explode', also  
has pressure in her   
ears with accompanying   
nausea vomiting and   
dizziness.  
She has tried multiple   
medications this   
admission including   
acetaminophen,  
Zofran, Decadron,   
ketorolac, magnesium   
with minimal benefit.  
Current   
Facility-Administered   
Medications  
Medication Dose Route   
Frequency  
- acetaZOLAMIDE 250 mg   
tab(s) (DIAMOX) 250 mg   
ORAL BID  
- sodium chloride 0.9   
% (flush) 3-5 mL (BD   
POSIFLUSH) 3-5 mL   
INTRAVENOUS  
q 12 H  
- sodium chloride 0.9   
% (flush) 2-10 mL (BD   
POSIFLUSH) 2-10 mL  
INTRAVENOUS q 12 H  
- ondansetron 4 mg   
tab(s) (ZOFRAN) 4 mg   
ORAL q 6 H PRN  
Or  
- ondansetron (PF) 4   
mg injection (ZOFRAN)   
4 mg INTRAVENOUS q 6 H   
PRN  
- acetaminophen 650 mg   
tab(s) (TYLENOL) 650   
mg ORAL q 6 H PRN  
- acetaminophen 325   
mg-caffeine 40   
mg-butalbital 50 mg 1   
tablet (FIORICET)  
1 tablet ORAL q 6 H   
PRN  
- dimenhyDRINATE 50 mg   
tab(s) (DRAMAMINE) 50   
mg ORAL q 6 H PRN  
No past medical   
history on file.  
No past surgical   
history on file.  
Social History  
Tobacco Use  
- Smoking status:   
Never Smoker  
- Smokeless tobacco:   
Never Used  
Substance Use Topics  
- Alcohol use: Never  
Frequency: Never  
- Drug use: Not on   
file  
Comment: not asked  
FAMILY HISTORY  
Problem Relation Age   
of Onset  
- No Ocular Disease No   
Family History  
ALLERGIES  
Allergen Reactions  
- Dilaudid [Hydromorp*   
Intolerance  
Pt had Nausea   
/Vomitinglasted 2 days  
REVIEW OF SYSTEMS:  
General: no wt.   
loss/gain, change in   
appetite, fever,   
malaise  
HEENT: no headache,   
problems with vision,   
hearing  
Cardiac: no chest   
pain, palpitation  
Respiratory: no   
shortness of breath,   
cough, cold  
GI: no change in bowel   
habits, abdominal pain  
: no incontinence,   
frequency, urgency  
Musculoskeletal: no   
joint/muscle pain, no   
swelling  
Skin: no rash  
Endocrine: no cold/hot   
intolerance, thyroid,   
diabetes  
Immunologic: no known   
immunologic disorders  
Neurologic/Psychiatric  
: no other known   
neurologic or   
psychiatric problems  
NEURO: See HPI  
Objective  
PHYSICAL EXAM:  
HEENT: atraumatic,   
normocephalic  
Neck: supple, no bruit  
Heart: (+)S1S2,   
regular rate and   
rhythm  
Extremities: good   
pulses  
Neurological:  
NEUROLOGICAL EXAM:  
MSE: Awake, alert,   
oriented x 3, language   
intact, attention and  
concentration normal  
CN: EOMI, no   
nystagmus, V1-V3   
intact, no facial   
weakness, normal   
hearing  
to communication, good   
elevation of soft   
palate, tongue midline   
with good  
strength, no   
dysarthria  
Motor: Gait: Deferred  
No pronator drift,   
rapid finger movements   
are symmetrical  
Normal tone and bulk  
No adventitious   
movements  
Power: Right Left  
Neck flexion 5/5  
Neck extension 5/5  
Trapezius 5/5 5/5  
Deltoids 5/5 5/5  
Biceps 5/5 5/5  
Triceps 5/5 5/5  
Finger Flex 5/5 5/5  
Hip Flexors 5/5 5/5  
Knee Extensors 5/5 5/5  
Knee Flexors 5/5 5/5  
Ankle DF 5/5 5/5  
Ankle PF 5/5 5/5  
Toe Flexion 5/5 5/5  
Toe Extension 5/5 5/5  
Coordination: intact   
finger to nose and   
heel to shin  
Reflexes:  
B T Br K A Plantars  
R 2+ 2+ 2+ 2+ 2+ down  
L 2+ 2+ 2+ 2+ 2+ down  
Sensory: intact to   
light touch, no   
extinction  
Romberg's deferred  
LABS/DATA:  
WBC (k/uL)  
Date Value  
2020 11.56  
2020 11.25  
2020 8.34  
RBC (m/uL)  
Date Value  
2020 5.06  
2020 4.50  
2020 4.83  
Platelet Count (k/uL)  
Date Value  
2020 287  
2020 231  
2020 262  
BUN (mg/dL)  
Date Value  
2020 5  
2020 10  
Creatinine (mg/dL)  
Date Value  
2020 0.74  
2020 0.87  
Lab Results  
Component Value Date  
NEUTP 93.1 2020  
ABSNEUT 10.76   
2020  
LYMPHP 5.7 2020  
ABSLYMPH 0.66   
2020  
ABSMONO 0.13   
2020  
EODINP 0.0 2020  
ABSEOSIN <0.03   
2020  
BASOP 0.1 2020  
ABSBASO <0.03   
2020  
Lab Results  
Component Value Date  
 2020  
HB 15.0 2020  
HCT 43.1 2020  
CA 8.8 2020  
GLUC 127 2020  
BUN 5 2020  
 2020  
K 3.6 2020  
CHLOR 109 2020  
CO2 18 2020  
ANION 9 2020  
No results found for:   
WSR, CRP, IGG  
No results found for:   
USCRP  
No results found for:   
CHOL  
No results found for:   
LDL  
No results found for:   
HDL  
No results found for:   
TG  
No results found for:   
HBA1C  
RECENT MICROBIOLOGY:  
Blood Cultures:  
Urine Cultures/UA:  
DATA:  
Diagnostic tests   
reviewed for today's   
visit:  
MRI cervical and   
thoracic spine   
-2020  
Normal morphology and   
signal intensity of   
the visualized spinal   
cord. ?  
Negative for syrinx   
involving cervical and   
thoracic spine. ?CSF   
flow  
study as discussed.  
Mild degenerative   
changes of the   
cervical spine as   
discussed. ?No  
significant spinal   
canal or significant   
foraminal stenosis   
throughout the  
cervical and lumbar   
spine.  
MRI brain -2020  
1. ?Hypoplastic left   
transverse and sigmoid   
sinus, likely   
incidental  
normal variant.  
2. ?Otherwise, no   
focal significant   
stenosis or filling   
defect within  
visualized venous   
sinuses.  
3. ?Chiari I   
malformation changes.  
Impression/Recommendat  
ions  
Miss Ba is a 25   
year old female with   
h/o Chiari I   
malformation;  
consulted for post LP   
headache.  
Examination reveals an   
awake, alert patient   
with no definite focal  
weakness or sensory   
loss. She continues to   
have head pressure.  
She had blood patch on   
2020 and 2020.  
-Conservative measures   
including laying flat,   
increasing hydration  
discussed with her  
-Consider putting her   
back on IV fluids;   
trial of IV caffeine -   
spoke  
with pharmacy and his   
is not in the   
formulary  
-If no benefit, may   
try headache cocktail  
-Zofran 8 mg IVP  
-Decadron 8 mg IVP  
-Depacon 1 g IVPB  
-Magnesium 2 g IVPB  
-Toradol 30 mg IVP  
SIGNATURE: Christy Day MD PATIENT NAME:   
Lissett Ba  
DATE: 2020 MRN: [id number]  
TIME: 12:40 PM   
PAGER/CONTACT #: [phone] [facility]  
   
                                                    NURSING PROGon 2020   
   
                                        NURSING PROG        HNO ID: [id number]  
Author: Christy (Rn)   
Hren, RN  
Service: ?  
Author Type:   
Registered Nurse  
Type: Nursing Progress   
Note  
Filed: 2020 7:02   
PM  
Note Text:  
Nursing Progress Note  
Patient Name: Lissett aB  
MRN: [id number]  
Patient Location:   
[place] [address]/[address]  
[facility]-  
______________________  
__  
Daily Note:AANDOX3.   
Nsg assessment   
completed see npr   
flowsheet. c/o frontal  
into occipital ha   
throbbing in nature   
6/10 reports occurred   
after raising  
HOB to eat breakfast.   
reports tingling to   
ble has resolved but   
back pain  
continues /10 sharp   
 needles into   
back feeling. MARY 5/5   
x4. no visual  
changes. medicated   
with prn tylenol for   
ha, refused fioricet   
stating not  
effective. plan of   
care discussed and in   
agreement. requesting   
to shower  
will notify Dr Cruz   
on rounds. call light   
within reach.  
1045 requesting for   
ears to be examined   
d/t feels possible ear   
infection  
r/t to dizziness, ha,   
nausea and recent rt   
ear pain. Dr Cruz   
notified  
will be in to see.  
1340 Dr Day in to see.  
1415 Dr Cruz in to   
see and examine ears.  
1800 called to room   
father at bedside   
requesting to speak to   
MD pt c/o  
increasing dizziness,   
reports ha remains   
6/10, ringing in ears   
and  
 pressure change in   
ears  Dr Cruz   
notified and will be   
in to see.  
This note was   
completed by: Christy Parada RN            [facility]  
   
                                        NURSING PROG        HNO ID: [id number]  
Author: Dominga (Rn)   
JERONIMO Perry  
Service: ?  
Author Type:   
Registered Nurse  
Type: Nursing Progress   
Note  
Filed: 2020 12:50   
AM  
Note Text:  
Nursing Progress Note  
Patient Name: Lissett Ba  
MRN: [id number]  
Patient Location:   
[id number]  
[facility]  
______________________  
__  
Daily Note:2100   
Patient assessed ao-3   
follows commands c/o   
head ache prn  
pain med given will   
continue to monitor.  
This note was   
completed by: Dominga Perry Rn          [facility]  
   
                                                    PROGRESSon 2020   
   
                                        PROGRESS            HNO ID: [id number]  
Author: Sagrario Cruz MD  
Service: General   
Internal Medicine  
Author Type: Physician  
Type: Progress Notes  
Filed: 2020 4:47   
PM  
Note Text:  
Continued to report   
headache and dizziness   
which has been   
impacting her  
mobility. Seen by   
neurology, headache   
cocktail was initiated   
IV fluids  
was resume.  
Concerned if dizziness   
is also related to   
inner ear problem.  
Most of the symptoms   
appears central   
however they have been   
no improvement  
for the last 48 hours.  
Will follow   
recommendation for   
neurology  
I will hold Diamox for   
now and continue   
hydration  
Consult ENT  
Assessment: Post LP   
headache  
Dizziness  
Plan as above [facility]  
   
                                                    CBC and Differentialon    
   
                      Abs Baso   <0.03      Normal     <0.11      [facility]  
   
                                        Comment on above:   Performed By: #### C  
BCDIF ####[place] [id number]   
   
                      Abs Mono   0.13 k/uL  Normal     <0.87      [facility]  
   
                                        Comment on above:   Performed By: #### C  
BCDIF ####[place] [phone]   
   
                      Abs Neut   10.76 k/uL High       1.45-7.50  [facility]  
   
                                        Comment on above:   Performed By: #### C  
BCDIF ####[place] [id number]   
   
                      Absolute nRBC <0.01      Normal     <0.01      [facility]  
   
                                        Comment on above:   Performed By: #### C  
BCDIF ####[place] [phone]   
   
                                                    Basophils/100 WBC   
(Bld)           0.1 %           Normal                          [facility]  
   
                                        Comment on above:   Performed By: #### C  
BCDIF ####[place] [id number]   
   
                      DTYPE      Auto Diff  Normal                [facility]  
   
                                        Comment on above:   Performed By: #### C  
BCDIF ####[place] [phone]   
   
                                                    Eosinophils (Bld)   
[#/Vol]         10*3/uL         Normal          <0.46           [facility]  
   
                                        Comment on above:   Performed By: #### C  
BCDIF ####[place] [phone]   
   
                                                    Eosinophils/100 WBC   
(Bld)           0.0 %           Normal                          [facility]  
   
                                        Comment on above:   Performed By: #### C  
BCDIF ####[place] [phone]   
   
                                                    Erythrocyte   
distribution width   
(RBC) [Ratio]   11.9 %          Normal          11.5-15.0       [facility]  
   
                                        Comment on above:   Performed By: #### C  
BCDIF ####[place] [phone]   
   
                                                    Hematocrit (Bld)   
[Volume fraction] 43.1 %          Normal          36.0-46.0       [facility]  
   
                                        Comment on above:   Performed By: #### C  
BCDIF ####[place] [phone]   
   
                                                    Hemoglobin (Bld)   
[Mass/Vol]      15.0 g/dL       Normal          11.5-15.5       [facility]  
   
                                        Comment on above:   Performed By: #### C  
BCDIF ####[place] [phone]   
   
                                                    Lymphocytes (Bld)   
[#/Vol]         0.66 10*3/uL    Low             1.00-4.00       [facility]  
   
                                        Comment on above:   Performed By: #### C  
BCDIF ####[place] [phone]   
   
                                                    Lymphocytes/100 WBC   
(Bld)           5.7 %           Normal                          [facility]  
   
                                        Comment on above:   Performed By: #### C  
BCDIF ####[place] [phone]   
   
                                                    MCH (RBC) [Entitic   
mass]           29.6 pG         Normal          26.0-34.0       [facility]  
   
                                        Comment on above:   Performed By: #### C  
BCDIF ####[place] [phone]   
   
                      MCHC (RBC) [Mass/Vol] 34.8 g/dL  Normal     30.5-36.0  [facility]  
   
                                        Comment on above:   Performed By: #### C  
BCDIF ####[place] [phone]   
   
                                                    MCV (RBC) [Entitic   
vol]            85.2 fL         Normal          80.0-100.0      [facility]  
   
                                        Comment on above:   Performed By: #### C  
BCDIF ####[place] [phone]   
   
                                                    Monocytes/100 WBC   
(Bld)           1.1 %           Normal                          [facility]  
   
                                        Comment on above:   Performed By: #### C  
BCDIF ####[address] [phone]   
   
                                                    Neutrophils/100 WBC   
(Bld)           93.1 %          Normal                          [facility]  
   
                                        Comment on above:   Performed By: #### C  
BCDIF ####[address] [phone]   
   
                      NRBCs      0.0 /100 WBC Normal     0          [facility]  
   
                                        Comment on above:   Performed By: #### C  
BCDIF ####[address] [phone]   
   
                                                    Platelet mean volume   
(Bld) [Entitic vol] 11.0 fL         Normal          9.0-12.7        [facility]  
   
                                        Comment on above:   Performed By: #### C  
BCDIF ####[address] [phone]   
   
                                                    Platelets (Bld)   
[#/Vol]         287 10*3/uL     Normal          150-400         [facility]  
   
                                        Comment on above:   Performed By: #### C  
BCDIF ####[address] [phone]   
   
                      RBC (Bld) [#/Vol] 5.06 10*6/uL Normal     3.90-5.20  [facility]  
   
                                        Comment on above:   Performed By: #### C  
BCDIF ####[address] [phone]   
   
                      WBC (Bld) [#/Vol] 11.56 10*3/uL High       3.70-11.00 [facility]  
   
                                        Comment on above:   Performed By: #### C  
BCDIF ####[address] [phone]   
   
                                                    Hemoglobin A1con 2020   
   
                                                    HbA1c (Bld) [Mass   
fraction]       5.1 %           Normal          4.3-5.6         [facility]  
   
                                        Comment on above:   Result Comment: Amer  
ican Diabetes Association guidelines   
indicate that patients with HgbA1c in the range 5.7-6.4% are at   
increased risk for development of diabetes, and intervention by   
lifestyle modification may be beneficial. HgbA1c greater or   
equal to 6.5% is considered diagnostic of diabetes.   
   
                                                            Performed By: #### H  
BA1C ####[facility]   
[place], [place] [phone]   
   
                                                    HbA1c (Bld) [Mass   
fraction]       100 mg/dL       [facility]  
   
                                        Comment on above:   Result Comment: eAG:  
 (Estimated average glucose) is a   
calculated value from HgbA1c and is representative of the   
average blood glucose level in the last 2-3 month period.   
   
                                                            Performed By: #### H  
BA1C ####[facility]   
[place], [place] [phone]   
   
                                                    NURSING PROGon 2020   
   
                                        NURSING PROG        HNO ID: [id number]  
Author: Christy ElyRn)   
Hren, RN  
Service: ?  
Author Type:   
Registered Nurse  
Type: Nursing Progress   
Note  
Filed: 2020 6:27   
PM  
Note Text:  
Nursing Progress Note  
Patient Name: Lissett Ba  
MRN: [id number]  
Patient Location:   
[id number]/[address]  
[facility]  
______________________  
__  
Daily Note:0815   
AANDOX3. Nsg   
assessment completed   
see NPR flowsheet. c/o   
ha  
frontal into occiptal   
area 5/10 aching , c/o   
mid/low back pain 7/10  
describes as sharp,   
stabbing non   
radiating, c/o ble   
tingling. strength  
5/5. plan of care   
discussed and in   
agreement. call light   
within reach.  
1750 requesting HbgA1   
test concerned re   
glucose levels on   
blood work,  
nausea, increased   
thirst and urination.   
Dr Cruz notified new   
order for  
HgbA1c entered into   
epic.  
This note was   
completed by: Christy Parada, JERONIMO            [facility]  
   
                                        NURSING PROG        HNO ID: [id number]  
Author: Thea ElyRn)   
Tim, RN  
Service: ?  
Author Type:   
Registered Nurse  
Type: Nursing Progress   
Note  
Filed: 2020 4:46   
AM  
Note Text:  
Nursing Progress Note  
Patient Name: Lissett Ba  
MRN: [id number]  
Patient Location:   
[id number]  
525-  
______________________  
__  
Daily Note:  
2000 pt returned to   
ROU tearful   
complaining of nausea   
at this time. Zofran  
given to pt. Pt also   
complaining of   
continues HA- cold   
compress applied to  
pt forehead at this   
time. Pt stating she   
almost fainted while   
receiving  
blood patch and is not   
planning to have an   
MRI tomorrow as she   
believes it  
will make her symptoms   
worse. Pt father   
called and stated they   
were angry  
with the care she has   
received and plan to   
come to the hospital   
to  get  
answers and cause   
trouble  despite known   
restrictions.  
0000 Pt resting and   
appears comfortable at   
this time. IV decadron   
given to  
pt at this time.  
0500 Pt continues to   
be in stable   
condition. Pt has no   
needs at this time.  
This note was   
completed by: Thea Dumont RN           [facility]  
   
                                                    PROGRESSon 2020   
   
                                        PROGRESS            HNO ID: [id number]  
Author: Sagrario Cruz MD  
Service: General   
Internal Medicine  
Author Type: Physician  
Type: Progress Notes  
Filed: 2020 1:45   
PM  
Note Text:  
patient describes her   
headaches about 6 out   
of 10.  
She is having shooting   
pain towards her lower   
extremities   
periodically  
While her symptoms not   
as bad as yesterday   
however she is still   
concerned  
about into symptoms  
She feel worse when   
she sit up  
Discussed with patient   
and her father at   
bedside, with some   
improvement  
compared to yesterday   
and her concern about   
any any further   
testing such  
as MRI at this time   
since her symptoms has   
worsened after the   
last MRI,  
she is agreeable to   
lay flat as much as   
possible today observe   
his  
symptoms continue to   
improve. Further   
imaging will be   
considered for any  
event flat noticed  
Assessment: Post LP   
headache  
Paresthesia  
Plan as above  
ROSY cruz            [facility]  
   
                                        PROGRESS            HNO ID: [id number]  
Author: Pierre Gillespie (Pa)  
Service: Neurosurgery  
Author Type: Physician   
Assistant  
Type: Progress Notes  
Filed: 2020 8:42   
AM  
Note Text:  
PROGRESS NOTE   
NEUROSURGERY  
SERVICE DATE: 2020  
SERVICE TIME: 1300  
Subjective  
INTERVAL HPI  
Patient with some   
improvement of HA   
compared to .   
Sitting up in bed  
eating lunch.   
Complains of some   
numbness and tingling   
in the bilateral  
legs. She is able to   
ambulate. No loss of   
bowel or bladder   
function.  
Current   
Facility-Administered   
Medications  
Medication Dose Route   
Frequency  
- acetaZOLAMIDE 250 mg   
tab(s) (DIAMOX) 250 mg   
ORAL BID  
- sodium chloride 0.9   
% (flush) 3-5 mL (BD   
POSIFLUSH) 3-5 mL   
INTRAVENOUS  
q 12 H  
- sodium chloride 0.9   
% (flush) 2-10 mL (BD   
POSIFLUSH) 2-10 mL  
INTRAVENOUS q 12 H  
- ondansetron 4 mg   
tab(s) (ZOFRAN) 4 mg   
ORAL q 6 H PRN  
Or  
- ondansetron (PF) 4   
mg injection (ZOFRAN)   
4 mg INTRAVENOUS q 6 H   
PRN  
- acetaminophen 650 mg   
tab(s) (TYLENOL) 650   
mg ORAL q 6 H PRN  
- acetaminophen 325   
mg-caffeine 40   
mg-butalbital 50 mg 1   
tablet (FIORICET)  
1 tablet ORAL q 6 H   
PRN  
Objective  
Alert, Oriented to   
Person, Place, Time  
EOMI  
PERRL  
Face Symmetric  
Tongue Midline  
MILLICENT  
No Drift  
VITAL SIGNS 24 HOUR   
REVIEW:  
Patient Vitals for the   
past 24 hrs:  
BP Temp Temp src Pulse   
Resp SpO2  
20 0801 114/56   
37 ?C (98.6 ?F) Oral   
72 18 98 %  
20 0450 110/61   
36.6 ?C (97.8 ?F) Oral   
65 18 98 %  
20 0008 113/57   
36.8 ?C (98.2 ?F) Oral   
75 18 97 %  
20 1920 112/68   
36.8 ?C (98.3 ?F) Oral   
91 18 100 %  
20 1554 110/64   
36.9 ?C (98.4 ?F) Oral   
93 18 98 %  
20 1200 118/68   
36.9 ?C (98.5 ?F) Oral   
91 16 99 %  
LABS:  
CBC, Coags, BMP, Mg,   
Phos  
Recent Labs  
20  
0549 20  
2354  
WBC 11.56* --  
HB 15.0 --  
HCT 43.1 --  
 --  
MG -- 2.1  
DATA:  
Diagnostic tests   
reviewed for today's   
visit:  
Most recent labs and   
imaging results.  
Assessment/Plan  
Principal Problem:  
Headache after spinal   
puncture POA: Yes  
Assessment AND Plan:   
Continue conservative   
care, lay flat,   
caffeine. No  
further imaging needed   
at this time.   
Outpatient follow up   
with Dr Argueta  
Active Problems:  
History of Chiari   
malformation POA: Yes  
Assessment AND Plan:   
as above  
Leukocytosis POA: Yes  
Assessment AND Plan:   
per primary  
Status post placement   
of ureteral stent POA:   
Yes  
Assessment AND Plan:   
per primary  
Resolved Problems:  
* No resolved hospital   
problems. *  
Medication and   
Non-Pharmacologic VTE   
Prophylaxis/Anticoagul  
ants  
20 1245 activity   
- mobilize patient   
([place], [place])  
20 0315   
pneumatic compression   
stockings ([place], [place])  
VTE Prophylaxis: VTE   
prophylaxis   
appropriate  
SIGNATURE: ZACK Serna PATIENT   
NAME: Lissett Ba  
DATE: 2020 MRN: [id number]  
TIME: 8:41 AM   
PAGER/CONTACT #: [id number] Normal                                  [facility]  
   
                                                    Urine Cultureon 2020   
   
                                                    Bacteria identified Cx   
Nom (U)                                 Sp. Request/Comment: -   
Specimen received in   
preservative  
  
Culture Result -   
10,000 - <50,000   
CFU/ml Normal   
urogenital lisa    [facility]  
   
                                        Comment on above:   Performed By: #### U  
RCUL ####[facility] [place], [place] [phone] [facility] [place], [place] [phone]   
   
                                                    CONSULTon 2020   
   
                                        CONSULT             HNO ID: [id number]  
Author: Bess Triplett (Pa)  
Service: Neurosurgery  
Author Type: Physician   
Assistant  
Type: Consults  
Filed: 2020 2:05   
PM  
Note Text:  
----------------------  
--------------  
Attestation signed by   
Mono Lucero at   
2020 6:16 PM  
will try additional   
blood patch  
Mono Lucero MD  
----------------------  
--------------  
NEUROSURGERY INPATIENT   
CONSULT  
SERVICE DATE: 2020  
SERVICE TIME: 1400  
PCP: Sandy Quintanilla,   
CNP  
Consultation requested   
by Hany Uribe MD  
[address] [place] [address] for   
an opinion regarding   
the evaluation and   
treatment  
of headache. My final   
impression and   
recommendations will   
be communicated  
back to the requesting   
physician by way of   
the shared medical   
record or  
letter via US mail.  
SUBJECTIVE  
Lissett Ba is a 25   
year old female   
presenting with   
significant other  
CHIEF   
COMPLAINT:headache  
HISTORY OF PRESENT   
ILLNESS  
PRECIPITATING EVENT:   
LP  
Headache for years.  
After recent LP she   
developed low pressure   
HA  
Was transferred here   
from OSH for blood   
patch  
Initially improved   
after blood patch  
Then after MRI HA   
returned  
Throbbing over   
occipital and frontal   
lobes constantly now  
She feels like she is   
going to pass out when   
she stands up  
Caffeine occasionally   
helps her headaches,   
but has not tried it   
this  
admission  
Denies   
numbness/tingling  
No diplopia or blurred   
vision  
PAIN EVALUATION  
2020  
0424 2020  
0900 2020  
1136 9/3/2020  
0558 9/3/2020  
0806  
Pain Level: 10 5 6 6 3  
Pain Location: Head   
Back-Upper Back-Upper   
Head Head  
Description: Aching   
Pressure Pressure   
Throbbing Throbbing  
Intervention:   
Medication;Reposition;  
Relaxation   
Reposition;Relaxation  
Medication   
Medication;Reposition;  
Relaxation Cold  
9/3/2020  
1007 9/3/2020  
1045 9/3/2020  
1442 9/3/2020  
2357 2020  
0004  
Pain Level: 5 4 7 7 6  
Pain Location: Head   
Head Head Head Head  
Description: Aching   
Aching Aching   
Throbbing Throbbing  
Intervention:   
Medication Cold   
Medication Medication   
Medication  
2020  
0855  
Pain Level: 5  
Pain Location: Head  
Description: Aching  
Intervention:   
Medication;Reposition;  
Relaxation;Education  
AMBULATORY STATUS:   
Independent Community   
Distances  
ANTIPLATELET OR   
ANTICOAGULATION   
STATUS: No  
PREVIOUS CONSERVATIVE   
TREATMENTS:  
Analgesics  
ACTIVE PROBLEM LIST  
Headache After Spinal   
Puncture  
History of Chiari   
Malformation  
Leukocytosis  
Status Post Placement   
of Ureteral Stent  
Kidney Stone  
No past medical   
history on file.  
No past surgical   
history on file.  
FAMILY HISTORY  
Problem Relation Age   
of Onset  
- No Ocular Disease No   
Family History  
Social History  
Tobacco Use  
- Smoking status:   
Never Smoker  
- Smokeless tobacco:   
Never Used  
Substance Use Topics  
- Alcohol use: Never  
Frequency: Never  
- Drug use: Not on   
file  
Comment: not asked  
ALLERGIES  
Allergen Reactions  
- Dilaudid [Hydromorp*   
Intolerance  
Pt had Nausea   
/Vomitinglasted 2 days  
MEDICATIONS:  
ondansetron orally   
disintegrating (ZOFRAN   
ODT) 4 mg   
disintegrating tablet  
Take 4 mg by mouth   
every 8 hours as   
needed.  
cephALEXin (KEFLEX)   
500 mg capsule Take   
500 mg by mouth once   
daily.  
acetaZOLAMIDE (DIAMOX)   
250 mg tablet Take 1   
tablet by mouth twice   
daily.  
oxybutynin ER   
(DITROPAN XL) 10 mg 24   
hr tablet Take 10 mg   
by mouth once  
daily.  
ketorolac (TORADOL) 10   
mg tablet Take 10 mg   
by mouth every 8 hours   
as  
needed.  
REVIEW OF SYSTEMS:  
PAIN ASSESSMENT: See   
HPI.  
GENERAL: Denies fever,   
chills malaise and   
weight loss.  
HEENT: No recent   
change in vision or   
hearing.  
CARDIOVASCULAR: Denies   
chest pain, history of   
A-fib, valvular   
disease, or  
pacemaker/ICD.  
RESPIRATORY: Denies   
SOB, sputum   
production, and   
hemoptysis.  
GI: Denies GI ulcers,   
inflammatory disease,   
or liver disease.  
: Denies change in   
frequency or urgency,   
kidney disease, and   
burning  
with urination.  
MUSCULOSKELETAL:   
Negative for joint   
pain or swelling, back   
pain or muscle  
pain.  
SKIN: Denies rash or   
itching.  
PSYCHOLOGICAL: Not   
reviewed  
NEURO: Headaches  
ENDOCRINE: Denies   
diabetes, thyroid   
disease.  
HEMATOLOGY/LYMPHOLOGY:   
Denies cancer,   
bleeding or clotting   
disorders,  
anemia,and DVT's.  
ALLERGIC/IMMUNOLOGICAL  
: Denies risks for   
infection, or recent   
MRSA  
infections.  
OBJECTIVE:  
PHYSICAL EXAM  
/63   Pulse 87     
Temp 37 ?C (98.6 ?F)   
(Oral)   Resp 16   Ht   
160  
cm (5' 3 )   Wt 63.1   
kg (139 lb 3.2 oz)     
SpO2 99%   BMI 24.66   
kg/m?  
GENERAL APPEARANCE:   
Well nourished, well   
developed, and no   
apparent  
distress.  
NEURO PSYCH: Patient   
oriented to person,   
place, and time. Mood   
pleasant.  
Benign affect.  
MUSCULOSKELETAL  
VISUAL INSPECTION  
CERVICAL: WNL  
THORACIC: WNL  
LUMBAR: WNL  
MOTOR: 5/5 in all   
muscle groups.  
SENSORY: Normal   
sensory exam  
No swelling over   
lumbar spine  
NEURO TESTS:  
Cranial Nerves:  
Normal mood and   
affect.  
CNII-XII grossly   
intact.  
No dysmetria  
DATA REVIEW  
[facility] records reviewed  
Imaging and outside   
records reviewed  
Images reviewed with   
the patient  
ASSESSMENT/PLAN  
IMPRESSION:  
Lissett Ba is a 25   
year old female who   
presented due to low   
pressure  
headaches following LP   
for blood patch. She   
has been seeing Dr. Argueta  
for tonsillar decent.   
Her workup has been   
negative for IIH. Her   
cervical  
cine flow demonstrated   
good flow anteriorly   
and posteriorly.   
Patient's  
father requested   
neurosurgery see her   
during admission.  
Will discuss   
possibility of second   
blood patch with   
anesthesia, if they  
are in agreement would   
recommend repeat blood   
patch  
Recommend   
resting/taking it easy   
for the next 2 weeks  
Lie flat with feet up   
to improve headaches  
Increase fluids  
Follow up with   
headache neurology for   
headache management;   
consult has  
been sent per ZACK Gimenez  
Follow up with Dr. Argueta in 6 m with   
repeat MRI (20)  
This has been   
communicated to   
patient's father by   
Dr. Argueta over   
phone.  
We will sign off.  
SIGNATURE: Bess Triplett PA-C  
PATIENT NAME: Lissett Ba  
DATE: 2020 MRN: [id number]  
TIME: 1:31 PM PAGER: Normal                                  [facility]  
   
                                                    Magnesiumon 2020   
   
                      Magnesium [Mass/Vol] 2.1 mg/dL  Normal     1.7-2.6    [facility]  
   
                                        Comment on above:   Performed By: #### M  
G1 ####[facility] [place], [place] [phone]   
   
                                                    NURSING PROGon 2020   
   
                                        NURSING PROG        HNO ID: [id number]  
Author: Pierre (Rn)   
Diliberto, RN  
Service: Nursing  
Author Type:   
Registered Nurse  
Type: Nursing Progress   
Note  
Filed: 2020 6:44   
PM  
Note Text:  
Nursing Progress Note  
Patient Name: Lissett Ba  
MRN: [id number]  
[facility]   
[id number]  
[facility]01  
______________________  
__  
Daily Note:Dr. Lucero in   
to see patient, stated   
to patient he would   
write  
for steroids and   
follow up with her   
over the weekend. Vss,   
safety  
maintained, will   
continue to monitor.  
This note was   
completed by: Pierre Jane RN       [facility]  
   
                                        NURSING PROG        HNO ID: [id number]  
Author: Sean ElyRn)   
Ningard, RN  
Service: ?  
Author Type:   
Registered Nurse  
Type: Nursing Progress   
Note  
Filed: 2020 2:12   
PM  
Note Text:  
Nursing Progress Note  
Patient Name: Lissett Ba  
MRN: [id number]  
Patient Location:   
[id number]/  
______________________  
__  
Daily Note:  
0845-Pt assessment   
complete. Pt AANDOX3.   
Pt denies  
CP,SOB,NANDV,numbness,  
tingling, or   
dizziness. Pt states   
she continues to  
have a constant   
headache. Pt requests   
to be given her MRI   
results. My  
safety plan discussed,   
pt verbalizes   
understanding. Pt   
updated on POC.  
Call light in reach.   
Continuous telemetry   
applied.  
1400-This RN spoke   
with Dr. Miller,   
discussed POC.  
1408-Dr. Lucero at   
bedside.  
This note was   
completed by: Sean Pardo RN         [facility]  
   
                                        NURSING PROG        HNO ID: [id number]  
Author: Zulay SARMIENTO (Rn)   
Edgardo, RN  
Service: Nursing  
Author Type:   
Registered Nurse  
Type: Nursing Progress   
Note  
Filed: 2020 12:09   
AM  
Note Text:  
Nursing Progress Note  
Patient Name: Lissett Ba  
MRN: [id number]  
Patient Location:   
EP-[id number]-3954/  
______________________  
__  
Daily Note:Pt resting   
in bed C/o headache   
pain. Adm headache   
cocktail  
as ordered. Resp even   
and unalbored. 0   
distress noted. Will   
cont to  
monitor.  
This note was   
completed by: Zulay Reynoso RN        [facility]  
   
                                                    PROGRESSon 2020   
   
                                        PROGRESS            HNO ID: [id number]  
Author: Aaron Miller  
Service: General   
Internal Medicine  
Author Type: Physician  
Type: Progress Notes  
Filed: 2020 12:51   
PM  
Note Text:  
ROU PROGRESS NOTE  
Name: Lissett Ba  
MRN: [id number]  
SERVICE DATE: 2020  
SERVICE TIME: 12:48 PM  
Hospital   
Medicine/Primary   
Attending: Aaron Miller DO  
ASSESSMENT AND PLAN  
*Headache after spinal   
puncture (2020):   
blood patch placed.  
Initially it helped   
her headache. Her   
headache worsened   
after lying flat  
in the MRI scanner. No   
relief over the past   
24 hours.  
History of Chiari   
malformation   
(2020): consult Dr Argueta.  
Leukocytosis   
(2020): repeat   
CBC.  
Status post placement   
of ureteral stent   
(2020): patient   
was scheduled  
to have the ureteral   
stent removed   
yesterday but she was   
unable to have it  
done because she was   
in the hospital.  
SUBJECTIVE  
INTERVAL HPI: I don't   
feel any better.  
MEDICATIONS: Reviewed  
OBJECTIVE  
PHYSICAL EXAM: BP   
117/63   Pulse 87     
Temp (Src) 98.6 (Oral)   
  Resp 16    
Ht 5' 3  (1.60m)   Wt   
139 lb 3.2 oz (63.1kg)   
  SpO2 99%   BMI 24.66  
kg/(m2).  
O2 Therapy: Room Air  
GENERAL: alert, no   
distress  
SKIN: No acute rashes  
LUNGS:  
CARDIAC:  
ABDOMEN: Abdomen soft,   
non-tender. BS normal.   
No masses or   
organomegaly.  
EXTREMITIES:   
Extremities normal. No   
deformities, edema,   
clubbing or skin  
discoloration.  
NEURO: Grossly normal   
cognition, motor   
function, and cranial   
nerves  
III-XII  
DATA:  
Diagnostic tests   
reviewed for today's   
visit:  
Most recent labs  
Most recent imaging  
VTE Prophylaxis: Early   
Ambulation  
Disposition: Home  
Plan of care discussed   
with: Patient and RN  
SIGNATURE: Aaron Miller DO  
DATE: 2020  
TIME: 12:48 PM      [facility]  
   
                                                    Urinalysis with Microscopico  
n 09-   
   
                                                    Bacteria LM.HPF (Urine   
sed) [#/Area]             Moderate                  Critically   
abnormal                  Negative                  [facility]  
   
                                        Comment on above:   Performed By: #### U  
AWMIC ####[place] [address]   
   
                      Bilirubin, Urine Negative   Normal     Negative   [facility]  
   
                                        Comment on above:   Performed By: #### U  
AWMIC ####[place] [address]   
   
                      Clarity (U) Clear      Normal     Clear      [facility]  
   
                                        Comment on above:   Performed By: #### U  
AWMIC ####[place] [address]   
   
                                Color (U)       Light Yellow    Critically   
abnormal                  Yellow                    [facility]  
   
                                        Comment on above:   Performed By: #### U  
AWMIC ####[place] [address]   
   
                      Comments   SEE COMMENT Normal                [facility]  
   
                                        Comment on above:   Result Comment: Micr  
oscopic Examination Performed   
   
                                                            Performed By: #### U  
AWMIC ####[place] [address]   
   
                                                    Epithelial cells   
LM.HPF (Urine sed)   
[#/Area]                  SEE COMMENT               Critically   
abnormal                  Negative                  [facility]  
   
                                        Comment on above:   Result Comment: Few  
Squamous Epithelial Cells   
   
                                                            Performed By: #### U  
AWMIC ####[place] [address]   
   
                      Glucose Ql (U) Negative   Normal     [facility]  
   
                                        Comment on above:   Performed By: #### U  
AWMIC ####[place] [address]   
   
                      Hemoglobin/Blood,Ur Negative   Normal     Negative   [facility]  
   
                                        Comment on above:   Performed By: #### U  
AWMIC ####[place] [address]   
   
                      Ketones Ql (U) Negative   Normal     Negative   [facility]  
   
                                        Comment on above:   Performed By: #### U  
AWMIC ####[place] [id number]   
   
                                Leukest         500             Critically   
abnormal                  Negative                  [facility]  
   
                                        Comment on above:   Performed By: #### U  
AWMIC ####[place] [id number]   
   
                      Mucus Ql (Urine sed) Present    Normal                [facility]  
   
                                        Comment on above:   Performed By: #### U  
AWMIC ####[id number]   
   
                      Nitrite Ql (U) Negative   Normal     [facility]  
   
                                        Comment on above:   Performed By: #### U  
AWMIC ####[place] [id number]   
   
                      pH (Bld)   6.5        Normal     5.0-8.0    [facility]  
   
                                        Comment on above:   Performed By: #### U  
AWMIC ####[place] [id number]   
   
                      Protein (U) [Mass/Vol] Negative   Normal     Negative   [facility]  
   
                                        Comment on above:   Performed By: #### U  
AWMIC ####[place] [id number]   
   
                                RBC (U) [#/Vol] Rare            Critically   
abnormal                  [facility]  
   
                                        Comment on above:   Performed By: #### U  
AWMIC ####[place] [id number]   
   
                      Specific Gravity, Ur 1.012      Normal     1.005-1.030 [facility]  
   
                                        Comment on above:   Performed By: #### U  
AWMIC ####[place] [id number]   
   
                      Urobilinogen Qn (U) Negative   Normal     Negative   [facility]  
   
                                        Comment on above:   Performed By: #### U  
AWMIC ####[place] [id number]   
   
                                WBC (Bld) [#/Vol] 10-25           Critically   
abnormal                  Negative                  [facility]  
   
                                        Comment on above:   Performed By: #### U  
AWMIC ####[place] [id number]   
   
                                                    ALLIED HEALTHon 2020   
   
                                        ALLIED HEALTH       HNO ID: [id number]  
Author: Vilma Guerrero (Tech)  
Service: Radiology  
Author Type:   
Technician  
Type: Allied Health  
Filed: 2020 10:08   
PM  
Note Text:  
Radiology Service   
Progress Note  
PATIENT NAME: Lissett Ba  
MRN: [id number]  
DATE OF SERVICE:   
2020  
TIME: 10:07 PM  
PATIENT IDENTITY   
VERIFICATION COMPLETED   
USING TWO (2)   
IDENTIFIERS: Name  
and Date of Birth   
confirmed by patient   
verbally and Name and   
Date of Birth  
confirmed by   
identification band.  
FALL SCREENING: Has   
the patient had 2   
falls in the last year   
or 1 fall  
with injury or   
currently using an   
Ambulatory Assistive   
Device (Walker,  
Cane, Wheelchair,   
Crutches, etc.)?   
Inpatient: Screened on   
floor  
PATIENT GENDER DATA:   
Female. Pregnancy   
status: Pregnant: No  
Breastfeeding status:   
NO.  
PATIENT RELEVANT   
IMPLANT DATA REVIEWED:   
Yes  
RADIOLOGY DEPARTMENT:   
MR; Exam(s) Completed:   
Spine: Cervical spine,  
Thoracic spine and   
cine  
PERIPHERAL IV DATA:   
Not applicable  
SIGNED BY: Vilma Guerrero  
2020   
10:07 PM            [facility]  
   
                                                    MRI CERVICAL SPINE WO IVCONo  
n 2020   
   
                                                    MRI CERVICAL SPINE WO   
IVCON                                   * * *Final Report* * *  
DATE OF EXAM: Sep 2   
2020 10:09PM  
[facility] 0297 - MRI   
CERVICAL SPINE WO   
IVCON / ACCESSION #   
[id number]  
PROCEDURE REASON:   
Chiari malformation  
  
* * * * Physician   
Interpretation * * * *  
EXAMINATION: MRI   
THORACIC SPINE WO   
IVCON, MRI CERVICAL   
SPINE WO IVCON  
CLINICAL HISTORY:   
Chiari I malformation.  
TECHNIQUE: Routine   
cervical and thoracic   
spine MR protocol   
without  
gadolinium. CSF flow   
study of the cervical   
levels.  
MQ: MRCTWO_3  
COMPARISON: None.  
RESULT:  
CERVICAL:  
Counting reference:   
Craniocervical   
junction. Anatomic   
Variants: None.  
Localizer images: No   
additional findings.  
Alignment: Mild   
reversal of the   
cervical lordosis at   
C5-6. Alignment  
is otherwise   
preserved.  
Craniocervical   
junction: Again noted   
is descent of the   
cerebellar  
tonsils approximately   
5 mm below the foramen   
magnum on the right  
suggesting Chiari   
malformation.  
Cord: The cervical   
spinal cord is within   
normal limits of   
signal  
intensity and   
morphology. Limited   
evaluation axial   
gradient images.  
There is no height T2   
signal to suggest   
recurrent.  
CSF flow study: There   
is bidirectional   
pulsatile CSF flow   
anterior and  
posterior to the   
cervical cord.  
Bone marrow   
signal/fracture: No   
evidence of pathologic   
marrow  
infiltration. No   
evidence of prior   
fracture.  
Cervical soft tissues:   
The paraspinal soft   
tissues are within   
normal  
limits.  
C2-C3: Canal and   
foramina are patent.  
C3-C4: Canal and   
foramina are patent.  
C4-C5: Canal and   
foramina are patent.  
C5-C6: Mild disc bulge   
abutting cord   
ventrally without   
compression.  
C6-C7: Canal and   
foramina are patent.  
C7-T1: Canal and   
foramina are patent.  
THORACIC:  
Counting reference:   
Craniocervical   
junction. The more   
conventional  
scouts that include   
the lumbosacral   
junction are not   
available for  
comparison.  
Localizer images: No   
additional findings.  
Alignment: Mild   
rightward curvature of   
the thoracic spine.   
Alignment  
is otherwise   
preserved.  
Cord: The thoracic   
spinal cord is within   
normal limits of   
signal  
intensity and   
morphology. There is   
no evidence of syrinx.  
Bone marrow   
signal/fracture: No   
evidence of pathologic   
marrow  
infiltration. No   
evidence of prior   
fracture. Height T2   
and height T1  
signal lesion within   
T11 represents osseous   
hemangioma.  
Thoracic soft tissues:   
The paraspinal soft   
tissues are within   
normal  
limits.  
Canal and foramina:   
The thoracic canal and   
foramina are patent   
within  
the constraints of the   
study.  
IMPRESSION:  
Normal morphology and   
signal intensity of   
the visualized spinal   
cord.  
Negative for syrinx   
involving cervical and   
thoracic spine. CSF   
flow  
study as discussed.  
Mild degenerative   
changes of the   
cervical spine as   
discussed. No  
significant spinal   
canal or significant   
foraminal stenosis   
throughout the  
cervical and lumbar   
spine.  
Cervical Anatomic   
Variant: None. Assume   
7 cervical vertebrae   
with  
counting from the   
craniocervical   
junction.  
: DANIEL  
Transcribe Date/Time:   
Sep 2 2020 10:20P  
Dictated by :   
RYAN MARTÍNEZ MD  
This examination was   
interpreted and the   
report reviewed and  
electronically signed   
by:  
RYAN MARTÍNEZ MD on Sep 2 2020   
10:38PM EST  
[id number] Normal                                  [facility]  
   
                                                    MRI THORACIC SPINE WO IVCONo  
n 2020   
   
                                                    MRI THORACIC SPINE WO   
IVCON                                   * * *Final Report* * *  
DATE OF EXAM: Sep 2   
2020 10:09PM  
[facility] 0325 - MRI   
THORACIC SPINE WO   
IVCON / ACCESSION #   
[id number]  
PROCEDURE REASON: Back   
pain, cancer or   
infection suspected  
  
* * * * Physician   
Interpretation * * * *  
EXAMINATION: MRI   
THORACIC SPINE WO   
IVCON, MRI CERVICAL   
SPINE WO IVCON  
CLINICAL HISTORY:   
Chiari I malformation.  
TECHNIQUE: Routine   
cervical and thoracic   
spine MR protocol   
without  
gadolinium. CSF flow   
study of the cervical   
levels.  
MQ: MRCTWO_3  
COMPARISON: None.  
RESULT:  
CERVICAL:  
Counting reference:   
Craniocervical   
junction. Anatomic   
Variants: None.  
Localizer images: No   
additional findings.  
Alignment: Mild   
reversal of the   
cervical lordosis at   
C5-6. Alignment  
is otherwise   
preserved.  
Craniocervical   
junction: Again noted   
is descent of the   
cerebellar  
tonsils approximately   
5 mm below the foramen   
magnum on the right  
suggesting Chiari   
malformation.  
Cord: The cervical   
spinal cord is within   
normal limits of   
signal  
intensity and   
morphology. Limited   
evaluation axial   
gradient images.  
There is no height T2   
signal to suggest   
recurrent.  
CSF flow study: There   
is bidirectional   
pulsatile CSF flow   
anterior and  
posterior to the   
cervical cord.  
Bone marrow   
signal/fracture: No   
evidence of pathologic   
marrow  
infiltration. No   
evidence of prior   
fracture.  
Cervical soft tissues:   
The paraspinal soft   
tissues are within   
normal  
limits.  
C2-C3: Canal and   
foramina are patent.  
C3-C4: Canal and   
foramina are patent.  
C4-C5: Canal and   
foramina are patent.  
C5-C6: Mild disc bulge   
abutting cord   
ventrally without   
compression.  
C6-C7: Canal and   
foramina are patent.  
C7-T1: Canal and   
foramina are patent.  
THORACIC:  
Counting reference:   
Craniocervical   
junction. The more   
conventional  
scouts that include   
the lumbosacral   
junction are not   
available for  
comparison.  
Localizer images: No   
additional findings.  
Alignment: Mild   
rightward curvature of   
the thoracic spine.   
Alignment  
is otherwise   
preserved.  
Cord: The thoracic   
spinal cord is within   
normal limits of   
signal  
intensity and   
morphology. There is   
no evidence of syrinx.  
Bone marrow   
signal/fracture: No   
evidence of pathologic   
marrow  
infiltration. No   
evidence of prior   
fracture. Height T2   
and height T1  
signal lesion within   
T11 represents osseous   
hemangioma.  
Thoracic soft tissues:   
The paraspinal soft   
tissues are within   
normal  
limits.  
Canal and foramina:   
The thoracic canal and   
foramina are patent   
within  
the constraints of the   
study.  
IMPRESSION:  
Normal morphology and   
signal intensity of   
the visualized spinal   
cord.  
Negative for syrinx   
involving cervical and   
thoracic spine. CSF   
flow  
study as discussed.  
Mild degenerative   
changes of the   
cervical spine as   
discussed. No  
significant spinal   
canal or significant   
foraminal stenosis   
throughout the  
cervical and lumbar   
spine.  
Cervical Anatomic   
Variant: None. Assume   
7 cervical vertebrae   
with  
counting from the   
craniocervical   
junction.  
: DANIEL  
Transcribe Date/Time:   
Sep 2 2020 10:20P  
Dictated by :   
RYAN MARTÍNEZ MD  
This examination was   
interpreted and the   
report reviewed and  
electronically signed   
by:  
RYAN MARTÍNEZ MD on Sep 2 2020   
10:38PM EST  
[id number] [facility]  
   
                                                    NURSING PROGon 2020   
   
                                        NURSING PROG        HNO ID: [id number]  
Author: Gladis ElyRn)   
JERONIMO Lake  
Service: ?  
Author Type:   
Registered Nurse  
Type: Nursing Progress   
Note  
Filed: 9/3/2020 8:56   
AM  
Note Text:  
Nursing Progress Note  
Patient Name: Lissett Ba  
MRN: [id number]  
Patient Location:   
[id number]  
______________________  
__  
Daily Note:0800 Pt is   
AANDOX3. C/O HA 3/10.   
Up with SBA. Steady   
gait.  
Tolerated clear   
liquids well. No N/V.   
Pt remains safe.   
Continue care  
plan.  
This note was   
completed by: Gladis Lake RN     [facility]  
   
                                        NURSING PROG        HNO ID: [id number]  
Author: Brisa ElyRn)   
JERONIMO Solomon  
Service: Nursing  
Author Type:   
Registered Nurse  
Type: Nursing Progress   
Note  
Filed: 9/3/2020 4:58   
AM  
Note Text:  
Nursing Progress Note  
Patient Name: Lissett Ba  
MRN: [id number]  
Patient Location:   
[id number]  
  
______________________  
__  
Daily Note:Pt alert,   
oriented x3, went for   
MRI Cervical AND   
Thoracic Spine,  
with recommendation to   
repeat . Medicated   
with Fioricet for   
headache at  
2340 Hrs. Pt continued   
on IVF NS 75ml/hr.   
Maintained on Tele   
with SR/ST.  
Reinforced use of call   
light. Would monitor.  
This note was   
completed by: Brisa Solomon RN         [facility]  
   
                                                    PROGRESSon 2020   
   
                                        PROGRESS            HNO ID: [id number]  
Author: Yola Moon  
Service: ?  
Author Type: Physician  
Type: Progress Notes  
Filed: 9/3/2020 11:23   
AM  
Note Text:  
RAPID OBSERVATION UNIT  
PROGRESS NOTE  
Name: Lissett Ba  
MRN: [id number]  
SERVICE DATE: 9/3/2020  
SERVICE TIME: 11:20 AM  
Hospital   
Medicine/Primary   
Attending: Yola Moon MD  
ASSESSMENT AND PLAN  
Patient Active   
Hospital Problem List:  
1. Headache after   
spinal puncture  
2. History of Chiari   
malformation POA: Yes  
3. Leukocytosis POA:   
Yes  
LP headache improved   
with blood patch  
- headache from her   
chiari   
malformation/benign   
intracranial   
hypertension  
back  
- trial of headache   
cocktail  
- MRI of Cervical   
spin/thoracic spine   
with normal flow, no   
acute findings.  
Will review results   
with Dr. Argueta's   
team.  
4. Status post   
placement of ureteral   
stent POA: Yes  
5. Kidney stone POA:   
Yes  
No inpatient   
management  
Yola Moon MD  
SUBJECTIVE  
INTERVAL HPI:  
Pt states headache   
from LP is improved.   
Able to get up and   
move around  
without increased   
headache now. However,   
pt started to get   
severe headache  
when she went to lay   
down for her MRI. It   
is typical for her   
headaches  
that she usually gets.   
It has not really   
improved overnight.  
MEDICATIONS: Reviewed  
OBJECTIVE  
PHYSICAL EXAM: BP   
108/59   Pulse 69     
Temp (Src) 98.5 (Oral)   
  Resp 18    
Ht 5' 3  (1.60m)   Wt   
139 lb 3.2 oz (63.1kg)   
  SpO2 100%   BMI   
24.66  
kg/(m2).  
O2 Therapy: Room Air  
GENERAL: alert, no   
distress, cooperative  
LUNGS: Lungs clear to   
auscultation. Good   
diaphragmatic   
excursion.  
CARDIAC: normal S1 and   
S2; no rubs, murmurs,   
or gallops  
ABDOMEN: Abdomen soft,   
non-tender. BS normal.   
No masses or   
organomegaly.  
EXTREMITIES:   
Extremities normal. No   
deformities, edema,   
clubbing or skin  
discoloration., No   
ulcers  
NEURO: Reflexes normal   
and symmetric.   
Sensation grossly   
intact., Cranial  
nerves II-XII intact  
PULSES: 2+ radial, 2+   
carotid  
DATA:  
Diagnostic tests   
reviewed for today's   
visit:  
Most recent labs  
VTE Prophylaxis: Early   
Ambulation  
Disposition: Home  
Plan of care discussed   
with: Patient  
SIGNATURE: Yola Moon MD  
DATE: September 3,   
2020  
TIME: 11:20 AM      Normal                                  [facility]  
   
                                                    Basic Metabolic Panlon    
   
                      Anion gap [Moles/Vol] 9 mmol/L   Normal     9-18       [facility]  
   
                                        Comment on above:   Performed By: #### C  
BCDIF, BETAMM, BMP ####[place] [id number]   
   
                      Calcium [Mass/Vol] 8.8 mg/dL  Normal     8.5-10.5   [facility]  
   
                                        Comment on above:   Performed By: #### C  
BCDIF, BETAMM, BMP ####[place] [id number]   
   
                      Chloride [Moles/Vol] 109 mmol/L Normal          [facility]  
   
                                        Comment on above:   Performed By: #### C  
BCDIF, BETAMM, BMP ####[place] [id number]   
   
                      CO2 [Moles/Vol] 18 mmol/L  Low        23-32      [facility]  
   
                                        Comment on above:   Performed By: #### C  
BCDIF, BETAMM, BMP ####[place] [id number]   
   
                      Creatinine [Mass/Vol] 0.74 mg/dL Normal     0.70-1.40  [facility]  
   
                                        Comment on above:   Performed By: #### C  
BCDIF, BETAMM, BMP ####[place] [id number]   
   
                      eGFR- Amer. >60        Normal     >60        [facility]  
   
                                        Comment on above:   Performed By: #### C  
BCDIF, BETAMM, BMP ####[place] [id number]   
   
                                                    GFR/1.73 sq M   
predicted among   
non-blacks MDRD   
(S/P/Bld) [Vol   
rate/Area]      mL/min/{1.73_m2} Normal          >60             [facility]  
   
                                        Comment on above:   Performed By: #### C  
BCDIF, BETAMM, BMP ####[place] [phone]   
   
                      Glucose [Mass/Vol] 127 mg/dL  High            [facility]  
   
                                        Comment on above:   Performed By: #### C  
BCDIF, BETAMM, BMP ####[place] [id number] [place], [place] [phone]   
   
                      Potassium [Moles/Vol] 3.6 mmol/L Normal     3.5-5.0    [facility]  
   
                                        Comment on above:   Performed By: #### C  
BCDIF, BETAMM, BMP ####[address] [phone]   
   
                      Sodium [Moles/Vol] 136 mmol/L Normal     132-148    [facility]  
   
                                        Comment on above:   Performed By: #### C  
BCDIF, BETAMM, BMP ####[place] [id number] [place], [place] [phone]   
   
                                                    Urea nitrogen   
[Mass/Vol]      5 mg/dL         Low             8-25            [facility]  
   
                                        Comment on above:   Performed By: #### C  
BCDIF, BETAMM, BMP ####[address] [phone]   
   
                                                    CASE MGT INIT ASSESon 2020   
   
                                        CASE MGT INIT ASSES HNO ID: [id number]  
Author: Hemalatha Navarro (Sw)  
Service: Care   
Management  
Author Type: Social   
Worker  
Type: Care Mgt Initial   
Assessment  
Filed: 2020 11:13   
AM  
Note Text:  
CARE MANAGEMENT:   
ASSESSMENT AND   
DISCHARGE PLAN  
SERVICE DATE:   
2020  
SERVICE TIME: 11:00am  
PRIMARY CARE   
PHYSICIAN:  
Sandy Quintanilla CNP  
Phone: [phone]  
ADMISSION STATUS:   
Observation  
Needs Prior to   
Discharge: To Be   
Determined  
MEDICAL:  
BLUE CARD PPO  
Patient/Representative   
Stated Goals: To have   
reduction in pain;To   
have  
reduction in   
symptoms;To improve my   
functional status  
Health Insurance:   
[employer]  
Health Issues   
Impacting Discharge   
Plan: Newly   
diagnosed;Chronic  
Newly Diagnosed: s/p   
LP Headache, s/p   
Ureteral Stent, Kidney   
Stone  
Chronic: Chiari   
Malformation  
Last Discharge Date:  
20  
Is this Within the   
Past 30 days?  
Last discharge within   
30 days: No  
Advance Directive:  
Current Advance   
Directive: None  
Care Manager Attempted   
to Assist with AD   
Completion: Yes  
Action: Education   
Provided;Patient   
Unwilling  
Health LiteracyHow   
often do you need to   
have someone help you   
when you  
read instructions,   
pamphlets, or other   
written material from   
your doctor  
or pharmacy? : 1 -   
Never  
How confident are you   
filling out medical   
forms by yourself?: 1   
-  
Extremely  
If Patient scores > 3   
on either question,   
the following   
interventions were  
put into place::   
Patient did not score   
> 3 on either   
question.  
Baseline Mental Status  
Prior to this Illness   
what was the patient's   
Baseline Mental   
Status?:  
Alert AND Oriented  
Prior to this illness,   
has anyone described   
the patient having any   
of the  
following behaviors?:   
Not Applicable  
Relationship of the   
informant to the   
patient:: Self  
Functional Status:   
Independent  
Does Patient Currently   
Receive Any Community   
Services or Home   
Care?: None  
Equipment Prior to   
Admission: None  
SOCIAL:  
Living Arrangements:   
Home  
Lives With:   
Partner;Other: See   
Comment(2 kids)  
Financial Resources:   
EmployedPrimary   
Contact: Extended   
Emergency Contact  
Information  
Primary Emergency   
Contact: WHITEZARA  
Mobile Phone:   
[phone]  
Relation: Mother  
Secondary Emergency   
Contact: DANE BA  
Mobile Phone:   
[phone]  
Relation: Father  
Supportive Patient   
Contact:: Yes  
Contact Resources:   
Family  
Family Name/Phone:   
Zara - Mother  
Social Needs  
Food insecurity  
Worry: Never true  
Inability: Never true  
Resources Needed: No  
Social Needs  
Financial resource   
strain: Not very hard  
Social Needs  
Transportation needs  
Medical: No  
Non-medical: No  
Caregiver   
AssessmentCaregiver is   
ready, willing and   
able to meet the  
patient's needs as   
recommended by the   
inter-professional   
team:: No  
Caregiver needed  
Does the patient have   
an acute stroke   
diagnosis, or has the   
patient had a  
stroke during this   
admission?: No  
Patient's transition   
needs and plan for   
meeting these needs:   
IPTA, plan is  
home with self-care,   
basic needs  
Patient's perception   
of need for this   
admission: s/p LP   
Headache  
Medication Adherance  
I am convinced of the   
importance of my   
prescription   
medication: 0 - Agree  
Completely  
I worry that my   
prescription   
medication will do   
more harm than good to   
me  
: 0 - Disagree   
Completely  
I feel financially   
burdened by my   
out-of-pocket expenses   
for my  
prescription   
medication:: 0 -   
Disagree Completely  
Risk Score: 0  
Patient is categorized   
as: Low risk < 2  
Are you interested in   
bedside delivery of   
your medications? No  
Is Patient   
Psychosocially   
Complex?: No  
ASSESSMENT AND PLAN:  
Medical Needs:  
Medical Needs: Two or   
more chronic diseases  
Psychosocial Needs:  
Psychosocial Needs:   
None  
FREEDOM OF CHOICE   
EXPLAINED:  
Freedom of Choice   
Given: No  
Reason Not Given: No   
placements necessary  
POTENTIAL TRANSITION   
PLANS  
Home;No Services   
Indicated  
SW spoke with pt on   
ROU using COVID   
precautions. Pt is   
26yo female  
referred to   
observation due to   
headache, kidney   
stone. AANDOX4,   
independent  
prior to admission   
with ADLs and iADLs,   
lives with her 2   
children and  
their father, working.   
Does not utilize any   
DME, skilled nursing   
or  
community resources.   
Has d/c transportation   
via her children's   
father,  
Hakan. Anticipate no   
skilled needs at d/c.   
SW/CM available to   
assist as  
needed with transition   
planning.  
SIGNATURE: ELIU HARDY PATIENT   
NAME: Lissett Ba  
DATE: 2020 MRN: [id number]  
TIME: 11:00 AM   
PAGER/CONTACT #:   
[phone]        Normal                                  [facility]  
   
                                                    CBC and Differentialon    
   
                      Abs Baso   0.05 k/uL  Normal     <0.11      [facility]  
   
                                        Comment on above:   Performed By: #### C  
BCDIF, BETAMM, BMP ####[place] [id number]   
   
                      Abs Mono   0.82 k/uL  Normal     <0.87      [facility]  
   
                                        Comment on above:   Performed By: #### C  
BCDIF, BETAMM, BMP ####[place] [id number]   
   
                      Abs Neut   8.60 k/uL  High       1.45-7.50  [facility]  
   
                                        Comment on above:   Performed By: #### C  
BCDIF, BETAMM, BMP ####[place] [id number]   
   
                      Absolute nRBC <0.01      Normal     <0.01      [facility]  
   
                                        Comment on above:   Performed By: #### C  
BCDIF, BETAMM, BMP ####[place] [id number]   
   
                                                    Basophils/100 WBC   
(Bld)           0.4 %           Normal                          [facility]  
   
                                        Comment on above:   Performed By: #### C  
BCDIF, BETAMM, BMP ####[place] [id number]   
   
                      DTYPE      Auto Diff  Normal                [facility]  
   
                                        Comment on above:   Performed By: #### C  
BCDIF, BETAMM, BMP ####[place] [address]   
   
                                                    Eosinophils (Bld)   
[#/Vol]         0.08 10*3/uL    Normal          <0.46           [facility]  
   
                                        Comment on above:   Performed By: #### C  
BCDIF, BETAMM, BMP ####[place] [address]   
   
                                                    Eosinophils/100 WBC   
(Bld)           0.7 %           Normal                          [facility]  
   
                                        Comment on above:   Performed By: #### C  
BCDIF, BETAMM, BMP ####[place] [address]   
   
                                                    Erythrocyte   
distribution width   
(RBC) [Ratio]   12.0 %          Normal          11.5-15.0       [facility]  
   
                                        Comment on above:   Performed By: #### C  
BCDIF, BETAMM, BMP ####[place] [address]   
   
                                                    Hematocrit (Bld)   
[Volume fraction] 39.5 %          Normal          36.0-46.0       [facility]  
   
                                        Comment on above:   Performed By: #### C  
BCDIF, BETAMM, BMP ####[place] [address]   
   
                                                    Hemoglobin (Bld)   
[Mass/Vol]      13.6 g/dL       Normal          11.5-15.5       [facility]  
   
                                        Comment on above:   Performed By: #### C  
BCDIF, BETAMM, BMP ####[place] [address]   
   
                                                    Lymphocytes (Bld)   
[#/Vol]         1.70 10*3/uL    Normal          1.00-4.00       [facility]  
   
                                        Comment on above:   Performed By: #### C  
BCDIF, BETAMM, BMP ####[place] [address]   
   
                                                    Lymphocytes/100 WBC   
(Bld)           15.1 %          Normal                          [facility]  
   
                                        Comment on above:   Performed By: #### C  
BCDIF, BETAMM, BMP ####[place] [id number]   
   
                                                    MCH (RBC) [Entitic   
mass]           30.2 pG         Normal          26.0-34.0       [facility]  
   
                                        Comment on above:   Performed By: #### C  
BCDIF, BETAMM, BMP ####[place] [phone]   
   
                      MCHC (RBC) [Mass/Vol] 34.4 g/dL  Normal     30.5-36.0  [facility]  
   
                                        Comment on above:   Performed By: #### C  
BCDIF, BETAMM, BMP ####[place] [id number]   
   
                                                    MCV (RBC) [Entitic   
vol]            87.8 fL         Normal          80.0-100.0      [facility]  
   
                                        Comment on above:   Performed By: #### C  
BCDIF, BETAMM, BMP ####[place] [id number]   
   
                                                    Monocytes/100 WBC   
(Bld)           7.3 %           Normal                          [facility]  
   
                                        Comment on above:   Performed By: #### C  
BCDIF, BETAMM, BMP ####[place] [id number]   
   
                                                    Neutrophils/100 WBC   
(Bld)           76.5 %          Normal                          [facility]  
   
                                        Comment on above:   Performed By: #### C  
BCDIF, BETAMM, BMP ####[place] [id number]   
   
                      NRBCs      0.0 /100 WBC Normal     0          [facility]  
   
                                        Comment on above:   Performed By: #### C  
BCDIF, BETAMM, BMP ####[place] [id number]   
   
                                                    Platelet mean volume   
(Bld) [Entitic vol] 11.2 fL         Normal          9.0-12.7        [facility]  
   
                                        Comment on above:   Performed By: #### C  
BCDIF, BETAMM, BMP ####[place] [phone]   
   
                                                    Platelets (Bld)   
[#/Vol]         231 10*3/uL     Normal          150-400         [facility]  
   
                                        Comment on above:   Performed By: #### C  
BCDIF, BETAMM, BMP ####[place] [id number] [place], [place] [phone]   
   
                      RBC (Bld) [#/Vol] 4.50 10*6/uL Normal     3.90-5.20  [facility]  
   
                                        Comment on above:   Performed By: #### C  
BCDIF, BETAMM, BMP ####[place] [address] [place], [place] [phone]   
   
                      WBC (Bld) [#/Vol] 11.25 10*3/uL High       3.70-11.00 [facility]  
   
                                        Comment on above:   Performed By: #### C  
BCDIF, BETAMM, BMP ####[facility] [place], [place] [phone]   
   
                                                    CONSULTon 2020   
   
                                        CONSULT             HNO ID: [id number]  
Author: Addy Roberts  
Service: Pain   
Management  
Author Type:   
Anesthesiologist  
Type: Consults  
Filed: 2020 2:42   
PM  
Note Text:  
Anesthesia Pain   
Service Consult Note  
PATIENT NAME: Lissett Ba  
: 1995  
MRN: [id number]  
Pain Assessment  
Pain Level  
 1136 6  
 09 5  
 0424 10  
Acceptable level  
 113 2  
Pain Assessment   
(RN/LPN)  
 113 Assessment  
900 Assessment  
 05   
Reassessment  
424 Assessment  
Intervention  
 113 Medication  
900   
Reposition;Relaxation  
424   
Medication;Reposition;  
Relaxation  
Most recent pain   
score: 3  
CONSULT TO PAIN   
MANAGEMENT   
(AK,AV,EU,FV,FILOMENA,MM,SP)  
Consult performed by:   
Addy Roberts  
Consult ordered by:   
Sandy Dominique  
Reason for consult:   
Epidural Blood Patch  
Assessment/Recommendat  
ions: iStatus post LP   
under fluoroscopy.  
Service Requesting   
Consult:   
Neurosurgery/Spine  
Opinion/Advice   
Regarding: Epidural   
Blood Patch s/p LP   
under fluoroscopy.  
Subjective  
Chief Complaint: Acute  
Interval HPI  
Location of Pain: Head  
Pain At Rest: 8, Pain   
Ambulation: 10  
Pain Character: Sharp,   
Stabbing, Shooting and   
Throbbing  
Pain Duration: Does   
not go away  
Pain Exacerbated By:   
sitting and standing  
Pain Relieved By:   
Lying down  
Pain Onset: Varies  
PCA  
Patient on IV PCA?: No   
,  
Block  
Candidate for Pre-Op   
Block?: Yes  
Anticipated Block   
Type: Epidural blood   
patch Block   
Laterality: Trunk  
Plan  
Analgesic options via:   
Epidural Blood Patch.  
Plan Discussed With:   
Caregiver, family and   
patient  
Patient status post LP   
under fluoroscopy   
being consulted to   
APMS for  
epidural blood patch.   
Epidural blood patch   
performed in PACU (see  
procedure note).   
Headache resolved.   
Educated Patient on   
lying flat for  
at least 2 hours and   
minimizing heavy   
lifting over 5 pounds   
and to avoid  
straining and bending   
over. APMS will   
sign-off at this time.   
Appreciate  
the consult.  
APS Progress Note ROS  
Sensory / Motor Exam  
Affect: alert,   
oriented to person,   
place, and time,   
awake,  
General Appearance:   
appears uncomfortable,  
Surgical Limb  
LUE: sensation intact  
RUE: sensation intact  
LLE: sensation intact  
RLE: sensation intact  
Non-Surgical Limb  
LUE: sensation intact  
RUE: sensation intact  
LLE: sensation intact  
RLE: sensation intact  
Trunk  
Abdomen: sensation   
intact  
Back: sensation intact  
Palpitation of   
abdomen: soft  
/59   Pulse 73     
Temp 36.9 ?C (98.4 ?F)   
(Oral)   Resp 13   Ht  
160 cm (5' 3 )   Wt   
63.1 kg (139 lb 3.2   
oz)   SpO2 100%   BMI   
24.66  
kg/m?  
No intake or output   
data in the 24 hours   
ending 20 1435  
Current   
Facility-Administered   
Medications  
Medication Dose Route   
Frequency  
- acetaZOLAMIDE 250 mg   
tab(s) (DIAMOX) 250 mg   
ORAL BID  
- sodium chloride 0.9   
% (flush) 3-5 mL (BD   
POSIFLUSH) 3-5 mL   
INTRAVENOUS  
q 12 H  
- sodium chloride 0.9   
% (flush) 2-10 mL (BD   
POSIFLUSH) 2-10 mL  
INTRAVENOUS q 12 H  
- ondansetron 4 mg   
tab(s) (ZOFRAN) 4 mg   
ORAL q 6 H PRN  
Or  
- ondansetron (PF) 4   
mg injection (ZOFRAN)   
4 mg INTRAVENOUS q 6 H   
PRN  
- acetaminophen 650 mg   
tab(s) (TYLENOL) 650   
mg ORAL q 6 H PRN  
- acetaminophen 325   
mg-caffeine 40   
mg-butalbital 50 mg 1   
tablet (FIORICET)  
1 tablet ORAL q 6 H   
PRN  
- NaCl 0.9% iv   
infusion 75 mL/hr   
INTRAVENOUS CONTINUOUS  
ALLERGIES  
Allergen Reactions  
- Dilaudid [Hydromorp*   
Intolerance  
Pt had Nausea   
/Vomitinglasted 2 days  
Lab Results:  
PT Sec 11.6 2020  
PT INR 1.1 2020  
Hemoglobin 13.6   
2020  
Hematocrit 39.5   
2020  
Platelet Count 231   
2020  
Problem List:  
*Headache after spinal   
puncture (2020)  
History of Chiari   
malformation   
(2020)  
Leukocytosis   
(2020)  
Status post placement   
of ureteral stent   
(2020)  
Kidney stone   
(2020)  
No past medical   
history on file.  
No past surgical   
history on file.  
FAMILY HISTORY  
Problem Relation Age   
of Onset  
- No Ocular Disease No   
Family History  
Social History  
Tobacco Use  
- Smoking status:   
Never Smoker  
- Smokeless tobacco:   
Never Used  
Substance Use Topics  
- Alcohol use: Never  
Frequency: Never  
- Drug use: Not on   
file  
Comment: not asked  
SIGNATURE: Addy Roberts DO  
DATE: 2020  
TIME: 2:35 PM       Normal                                  [facility]  
   
                                                    Coronavirus 2019on   
0   
   
                          COVID 19 Result NP Negative     Normal       Negative   
for   
COVID19   
(SARS CoV2)   
by PCR.                                 [facility]  
   
                                        Comment on above:   Result Comment: This  
 test was developed and its performance   
characteristics determined by [facility]. This test   
has been authorized by FDA under an Emergency Use Authorization   
(EUA).  
This test has been validated in accordance with the FDA's   
Guidance Document  Policy for Diagnostics Testing in   
Laboratories Certified to Perform High Complexity Testing under   
CLIA prior to Emergency use Authorization for Coronavirus   
Disease 2019 during the Public Health Emergency  issued on   
2020.   
   
                                                            Performed By: #### C  
OVID ####[facility] [place], [place] [phone] [place], [place] [phone]   
   
                                        COVID 19 Source NP  UPPER RESPIRATORY   
TRACT SWAB          Normal                                  [facility]  
   
                                        Comment on above:   Performed By: #### C  
OVID ####[facility] [place] [address] [phone]   
[id number] [place], [place] [phone]   
   
                                                    HISTORY PHYSICALon   
0   
   
                                        HISTORY PHYSICAL    HNO ID: [id number]  
Author: Sandy Dominique  
Service: General   
Internal Medicine  
Author Type: Nurse   
Practitioner  
Type: HANDP  
Filed: 2020 6:35   
AM  
Note Text:  
HISTORY AND PHYSICAL   
EXAMINATION  
SERVICE DATE: 2020  
SERVICE TIME: 3:31 AM  
PRIMARY CARE   
PHYSICIAN: Sandy Quintanilla CNP  
Subjective  
CHIEF COMPLAINT:   
headache  
HPI: This is a 25 year   
old female who   
presents with headache   
that started  
on  following LP   
done on . The   
patient has Chiari   
malformation  
and has been having   
more HA lately so was   
seen by neurosurgery   
and advised  
to have LP which was   
done on . The   
patient states she did   
fine with  
the procedure, but the   
next day noticed   
worsening HA, nausea.   
The patient  
presented to ED near   
her home   
([facility])   
and was advised to  
transfer to  since   
she had LP done here.   
She states while in   
the ED,  
when she was told to   
lay down flat, this   
initially helped her   
HA greatly,  
and when she sat up   
again, her HA pain   
worsened. She states   
she sat up and  
laid down again, and   
at that point, her HA   
just stayed the same.   
She c/o  
pressure in her eyes   
and in the back of her   
neck. She states it   
feels like  
something is pushing   
on her spine. She   
denies fever or   
chills. She was  
admitted to  CDU for   
further monitoring,   
symptom management,   
and  
possibly a blood   
patch.  
No past medical   
history on file.  
No past surgical   
history on file.  
FAMILY HISTORY  
Problem Relation Age   
of Onset  
- No Ocular Disease No   
Family History  
Social History  
Tobacco Use  
- Smoking status:   
Never Smoker  
- Smokeless tobacco:   
Never Used  
Substance Use Topics  
- Alcohol use: Never  
Frequency: Never  
- Drug use: Not on   
file  
Comment: not asked  
- ondansetron orally   
disintegrating (ZOFRAN   
ODT) 4 mg   
disintegrating  
tablet, Take 4 mg by   
mouth every 8 hours as   
needed., Disp: , Rfl:   
,  
2020 at Unknown   
time  
- cephALEXin (KEFLEX)   
500 mg capsule, Take   
500 mg by mouth once   
daily.,  
Disp: , Rfl: ,   
2020 at Unknown   
time  
- acetaZOLAMIDE   
(DIAMOX) 250 mg   
tablet, Take 1 tablet   
by mouth twice  
daily., Disp: 60   
tablet, Rfl: 1,   
2020 at Unknown   
time  
- oxybutynin ER   
(DITROPAN XL) 10 mg 24   
hr tablet, Take 10 mg   
by mouth  
once daily., Disp: ,   
Rfl:  
- ketorolac (TORADOL)   
10 mg tablet, Take 10   
mg by mouth every 8   
hours as  
needed., Disp: , Rfl:   
, 2020  
ALLERGIES  
No Known Allergies  
COMPLETE REVIEW OF   
SYSTEMS:  
PAIN ASSESSMENT:   
CURRENTLY HAVING PAIN;   
see HPI  
GENERAL: No weight   
loss, malaise or   
fevers  
HEENT: Positive for   
headache and blurred   
vision  
NECK: Positive for   
pain, no significant   
neck swelling  
RESPIRATORY: Negative   
for cough, wheezing,   
and shortness of   
breath  
CARDIOVASCULAR:   
Negative for chest   
pain, leg swelling,   
and palpitations  
GI: (+) nausea, no   
vomiting or diarrhea  
: No history of   
dysuria, frequency or   
incontinence  
GYN: Negative for   
abnormal vaginal   
bleeding, abnormal   
vaginal discharge  
MUSCULOSKELETAL: +back   
pain  
SKIN: Negative for   
lesions, rash, and   
itching  
PSYCH: Negative for   
sleep disturbance,   
mood disorder and   
recent  
psychosocial stressors  
HEMATOLOGY/LYMPHOLOGY:   
Negative for prolonged   
bleeding, bruising   
easily or  
swollen nodes  
ENDOCRINE: Negative   
for cold or heat   
intolerance, polyuria,   
polydipsia and  
goiter  
NEURO: +history of   
headaches, negative   
for seizures  
Objective  
PHYSICAL EXAM:  
Physical Exam   
Performed:  
GENERAL: Alert, no   
acute distress,   
cooperative, laying   
flat in bed  
SKIN: Skin color,   
texture, turgor   
normal. No rashes or   
lesions noted.  
HEAD: No significant   
findings  
EYES: EOMI, sclera   
anicteric  
OROPHARYNX: not   
visualized d/t patient   
wearing a mask  
NECK: Carotid pulse   
normal contour, Supple  
BACK: Back symmetric,   
ROM normal  
LUNGS: Lungs clear to   
auscultation, no   
respiratory distress   
or use of  
accessory muscles   
noted  
CARDIAC: mildly tachy  
ABDOMEN: Abdomen soft,   
non-tender  
EXTREMITIES: no edema  
NEURO: Grossly normal   
cognition and motor   
function, no focal   
neuro deficit  
noted  
PULSES: 2+ radial, 2+   
carotid  
/72   Pulse 108   
  Temp (Src) 97.6   
(Oral)   Resp 17   Wt   
139 lb 3.2  
oz (63.1kg)   SpO2   
100%  
O2 Therapy: Room Air  
DATA:  
Diagnostic tests   
reviewed for today's   
visit:  
Outside chart from   
[place] ED reviewed.   
-- see records in   
patient's paper  
chart  
Assessment/Plan  
Principal Problem:  
Headache after spinal   
puncture  
History of Chiari   
malformation  
Leukocytosis  
- s/p LP on  for   
further evaluation of   
HA/blurred vision in   
the  
setting of chiari   
malformation, had been   
started on Diamox   
prior to LP  
- developed positional   
HA, blurred vision and   
nausea 1 day s/p LP  
- overall HA was   
positional and   
improved with laying   
flat, now laying flat  
not helping as much  
- no fever or chills  
- per OSH, WBC 16.7,   
CT brain negative, UA   
appears negative for   
infection  
- was given a dose of   
ceftriaxone at OSH   
since WBC elevated  
PLAN:  
- observation  
- telemetry  
- recheck CBC and BMP  
- pain/symptom   
control, trial HA   
cocktail  
- neuro checks  
- IVF  
- anesthesia consult   
for possible blood   
patch  
- continue to monitor  
Active Problems:  
Status post placement   
of ureteral stent  
Kidney stone  
- s/p right ureteral   
stent for kidney   
stone, was scheduled   
for removal  
TODAY at OSH  
- CT A/P at OSH   
unremarkable/shows   
good placement of   
stent, UA appears  
negative for infection  
- continue daily   
prophylactic Keflex as   
was taking prior to   
admission  
Resolved Problems:  
* No resolved hospital   
problems. *  
edication and   
Non-Pharmacologic VTE   
Prophylaxis/Anticoagul  
ants  
20 0315   
pneumatic compression   
stockings (fl,oh)  
VTE Prophylaxis: VTE   
prophylaxis   
appropriate  
SIGNATURE: AB Guallpa.CNP   
PATIENT NAME: Lissett Ba  
DATE: 2020 MRN: [id number]  
TIME: 3:31 AM   
PAGER/CONTACT #:   
[phone]              [facility]  
   
                                                    NURSING PROGon 2020   
   
                                        NURSING PROG        HNO ID: [id number]  
Author: Diane (Rn)   
Sleva, RN  
Service: ?  
Author Type:   
Registered Nurse  
Type: Nursing Progress   
Note  
Filed: 2020 3:28   
PM  
Note Text:  
Nursing Progress Note  
Patient Name: Lissett Ba  
MRN: [id number]  
Patient Location: [facility]   
OR POOL/[facility] OR POOL  
______________________  
__  
Daily Note:  
1230: Pt off floor   
with transport in   
stable condition.  
1500: Pt returned to   
unit. Boyfriend   
remains at bedside.  
This note was   
completed by: Diane Mcrae RN           [facility]  
   
                                        NURSING PROG        HNO ID: [id number]  
Author: Darcy ElyRnJaylon Gomes RN  
Service: ?  
Author Type:   
Registered Nurse  
Type: Nursing Progress   
Note  
Filed: 2020 11:33   
AM  
Note Text:  
Nursing Progress Note  
Patient Name: Lissett Ba  
MRN: [id number]  
Patient Location:   
[id number]  
______________________  
__  
Daily Note:  
0700-Assumed care of   
patient. Received   
report from Brisa VASQUEZ Rn. Assessment  
charted in NPR. AANDO   
x 3, pleasant and   
cooperative.VSS;   
Afebrile. Telemetry  
order verified and   
current, NSR on   
monitor, no C/O chest   
pain/discomfort.  
Neuro checks intact.   
Speech is clear and   
coherent. Bilateral   
extremities  
strong and equal with   
facial symmetry. Pt   
denies headache at   
this time. Pt  
does c/o pressure in   
upper back area that   
radiated to neck. Dr. Moon  
notified. Pt states no   
needs at this time.   
Will continue to   
monitor.  
Safety maintained at   
this time, call light   
and personal   
belongings in  
reach, bed locked and   
lowered.  
0947-Pt c/o headache   
and nausea after   
getting up to   
bathroom. Prn  
medication given per   
MAR.  
1100-Report given to   
Diane VASQUEZ Rn.  
This note was   
completed by: Darcy Gomes RN          [facility]  
   
                                        NURSING PROG        HNO ID: [id number]  
Author: Brisa Gonzalez)   
JERONIMO Solomon  
Service: Nursing  
Author Type:   
Registered Nurse  
Type: Nursing Progress   
Note  
Filed: 2020 5:10   
AM  
Note Text:  
Nursing Progress Note  
Patient Name: Lissett Ba  
MRN: [id number]  
Patient Location:   
[id number]/  
______________________  
__  
Transfer Note:  
Patient transferred   
into room/unit   
525/Bed1 in stable   
condition. Actions  
taken: Patient   
belongings with   
patient. Pt admitted   
from [place] ED with  
spinal headache. Pt   
had LP done at  for   
Chiari Malformation,   
was at work  
yesterday , when she   
started having   
headache, Nausea AND   
Vomiting. Pt placed  
on Tele with sinus   
Tachy. New Heplock   
inserted. Bed alarms   
on. Reinforced  
use of call light.   
Would monitor.  
0415Hrs- Headache   
cocktail given. Pt   
resting comfortably.  
This note was   
completed by: Brisa Solomon RN         [facility]  
   
                                                    PROGRESSon 2020   
   
                                        PROGRESS            HNO ID: [id number]  
Author: Yola Moon  
Service: ?  
Author Type: Physician  
Type: Progress Notes  
Filed: 2020 3:43   
PM  
Note Text:  
RAPID OBSERVATION UNIT  
PROGRESS NOTE  
Name: Lissett Ba  
MRN: [id number]  
SERVICE DATE: 2020  
SERVICE TIME: 1:01 PM  
Hospital   
Medicine/Primary   
Attending: Yola Moon MD  
ASSESSMENT AND PLAN  
Patient Active   
Hospital Problem List:  
1. Headache after   
spinal puncture  
2. History of Chiari   
malformation POA: Yes  
3. Leukocytosis POA:   
Yes  
Getting blood patch at   
this time  
- discussed case with   
[facility]  
- plan to get MRI   
cervical spine with   
cine flow and MRI   
lumbar spine, per  
his request. He will   
be seeing her this   
week in follow up  
4. Status post   
placement of ureteral   
stent POA: Yes  
5. Kidney stone POA:   
Yes  
Will follow up   
outpatient with her   
urologist  
- on keflex  
Yola Moon MD  
Addendum:  
Just returned from   
blood patch  
- plan for MRI  
- likely observe   
overnight to make sure   
improving before   
discharging;  
especially considering   
she lives over an hour   
home.  
Yola Moon MD  
SUBJECTIVE  
INTERVAL HPI:  
Still with severe   
headache, worse with   
standing.  
MEDICATIONS: Reviewed  
OBJECTIVE  
PHYSICAL EXAM: BP   
105/54   Pulse 95     
Temp (Src) 98.4 (Oral)   
  Resp 17    
Ht 5' 3  (1.60m)   Wt   
139 lb 3.2 oz (63.1kg)   
  SpO2 99%   BMI 24.66  
kg/(m2).  
O2 Therapy: Room Air  
GENERAL: alert, no   
distress, cooperative,   
appears uncomfortable  
LUNGS: Lungs clear to   
auscultation. Good   
diaphragmatic   
excursion.  
CARDIAC: normal S1 and   
S2; no rubs, murmurs,   
or gallops  
ABDOMEN: Abdomen soft,   
non-tender. BS normal.   
No masses or   
organomegaly.  
EXTREMITIES:   
Extremities normal. No   
deformities, edema,   
clubbing or skin  
discoloration., No   
ulcers  
NEURO: Reflexes normal   
and symmetric.   
Sensation grossly   
intact., Cranial  
nerves II-XII intact  
PULSES: 2+ radial, 2+   
carotid  
DATA:  
Diagnostic tests   
reviewed for today's   
visit:  
Most recent labs  
VTE Prophylaxis: Early   
Ambulation  
Disposition: Home  
Plan of care discussed   
with: Patient  
SIGNATURE: Yola Moon MD  
DATE: 2020  
TIME: 1:01 PM       Normal                                  [facility]  
   
                                                    Serum Beta HCG East//FILOMENA/ME  
D/SL/LOon 2020   
   
                      HCG.beta subunit Qn Negative   Normal     Negative   [facility]  
   
                                        Comment on above:   Performed By: #### C  
BCDIF, BETAMM, BMP ####[facility] [place], [place] [id number]   
   
                                                    HOSPon 2020   
   
                                        HOSP                Patient:Gene Ba  
en  
MRN:  
Height:5' 3 (1.6 m)  
Weight:139 lb 3.2 oz   
(63.141 kg)  
Outpatient Medications   
as of 20:  
ondansetron orally   
disintegrating (ZOFRAN   
ODT) 4 mg   
disintegrating tablet  
oxybutynin ER   
(DITROPAN XL) 10 mg 24   
hr tablet  
ketorolac (TORADOL) 10   
mg tablet  
cephALEXin (KEFLEX)   
500 mg capsule  
acetaZOLAMIDE (DIAMOX)   
250 mg tablet  
Admission/Clinic   
Administered   
Medications as of   
20:  
acetaZOLAMIDE 250 mg   
tab(s) (DIAMOX)  
sodium chloride 0.9 %   
(flush) 3-5 mL (BD   
POSIFLUSH)  
sodium chloride 0.9 %   
(flush) 2-10 mL (BD   
POSIFLUSH)  
ondansetron 4 mg   
tab(s) (ZOFRAN)  
ondansetron (PF) 4 mg   
injection (ZOFRAN)  
acetaminophen 650 mg   
tab(s) (TYLENOL)  
acetaminophen 325   
mg-caffeine 40   
mg-butalbital 50 mg 1   
tablet (FIORICET)  
Problem List:  
Headache after spinal   
puncture [G97.1]  
History of Chiari   
malformation [Z86.69]  
Leukocytosis [D72.829]  
Status post placement   
of ureteral stent   
[Z96.0]  
Kidney stone [N20.0]  
Allergies:  
Dilaudid   
[Hydromorphone]  
Date Verified: 20  
Lab Values  
Lab Value Units Date   
High Low  
POTA* 3.6 mmol/L   
2020 5.0 3.5  
HEMA* 39.5 %   
2020 46.0 36.0  
Progress Notes ():  
Brisa Solomon RN, RN   
2020 5:10 AM   
Signed  
Nursing Progress Note  
Patient Name: Lissett Ba  
MRN: [id number]  
[facility]   
[id number]  
[facility]  
______________________  
__  
Transfer Note:  
Patient transferred   
into room/unit   
525/Bed1 in stable   
condition. Actions  
taken: Patient   
belongings with   
patient. Pt admitted   
from [place] ED with   
spinal  
headache. Pt had LP   
done at  for Chiari   
Malformation, was at   
work yesterday ,  
when she started   
having headache,   
Nausea AND Vomiting.   
Pt placed on Tele with  
sinus Tachy. New   
Heplock inserted. Bed   
alarms on. Reinforced   
use of call light.  
Would monitor.  
0415Hrs- Headache   
cocktail given. Pt   
resting comfortably.  
This note was   
completed by: JERONIMO Conway APRN.CNP 2020 6:35   
AM Signed  
HISTORY AND PHYSICAL   
EXAMINATION  
SERVICE DATE: 2020  
SERVICE TIME: 3:31 AM  
PRIMARY CARE   
PHYSICIAN: Sandy Quintanilla CNP  
Subjective  
CHIEF COMPLAINT:   
headache  
HPI: This is a 25 year   
old female who   
presents with headache   
that started on  
 following LP   
done on . The   
patient has Chiari   
malformation and has  
been having more HA   
lately so was seen by   
neurosurgery and   
advised to have LP  
which was done on   
. The patient   
states she did fine   
with the procedure,   
but  
the next day noticed   
worsening HA, nausea.   
The patient presented   
to ED near her  
home   
([facility])   
and was advised to   
transfer to  since   
she had LP done  
here. She states while   
in the ED, when she   
was told to lay down   
flat, this  
initially helped her   
HA greatly, and when   
she sat up again, her   
HA pain  
worsened. She states   
she sat up and laid   
down again, and at   
that point, her HA  
just stayed the same.   
She c/o pressure in   
her eyes and in the   
back of her neck.  
She states it feels   
like something is   
pushing on her spine.   
She denies fever or  
chills. She was   
admitted to  CDU for   
further monitoring,   
symptom management,  
and possibly a blood   
patch.  
No past medical   
history on file.  
No past surgical   
history on file.  
FAMILY HISTORY  
Problem Relation Age   
of Onset  
- No Ocular Disease No   
Family History  
Social History  
Tobacco Use  
- Smoking status:   
Never Smoker  
- Smokeless tobacco:   
Never Used  
Substance Use Topics  
- Alcohol use: Never  
Frequency: Never  
- Drug use: Not on   
file  
Comment: not asked  
- ondansetron orally   
disintegrating (ZOFRAN   
ODT) 4 mg   
disintegrating tablet,  
Take 4 mg by mouth   
every 8 hours as   
needed., Disp: , Rfl:   
, 2020 at Unknown  
time  
- cephALEXin (KEFLEX)   
500 mg capsule, Take   
500 mg by mouth once   
daily., Disp: ,  
Rfl: , 2020 at   
Unknown time  
- acetaZOLAMIDE   
(DIAMOX) 250 mg   
tablet, Take 1 tablet   
by mouth twice daily.,  
Disp: 60 tablet, Rfl:   
1, 2020 at Unknown   
time  
- oxybutynin ER   
(DITROPAN XL) 10 mg 24   
hr tablet, Take 10 mg   
by mouth once  
daily., Disp: , Rfl:  
- ketorolac (TORADOL)   
10 mg tablet, Take 10   
mg by mouth every 8   
hours as  
needed., Disp: , Rfl:   
, 2020  
ALLERGIES  
No Known Allergies  
COMPLETE REVIEW OF   
SYSTEMS:  
PAIN ASSESSMENT:   
CURRENTLY HAVING PAIN;   
see HPI  
GENERAL: No weight   
loss, malaise or   
fevers  
HEENT: Positive for   
headache and blurred   
vision  
NECK: Positive for   
pain, no significant   
neck swelling  
RESPIRATORY: Negative   
for cough, wheezing,   
and shortness of   
breath  
CARDIOVASCULAR:   
Negative for chest   
pain, leg swelling,   
and palpitations  
GI: (+) nausea, no   
vomiting or diarrhea  
: No history of   
dysuria, frequency or   
incontinence  
GYN: Negative for   
abnormal vaginal   
bleeding, abnormal   
vaginal discharge  
MUSCULOSKELETAL: +back   
pain  
SKIN: Negative for   
lesions, rash, and   
itching  
PSYCH: Negative for   
sleep disturbance,   
mood disorder and   
recent psychosocial  
stressors  
HEMATOLOGY/LYMPHOLOGY:   
Negative for prolonged   
bleeding, bruising   
easily or  
swollen nodes  
ENDOCRINE: Negative   
for cold or heat   
intolerance, polyuria,   
polydipsia and  
goiter  
NEURO: +history of   
headaches, negative   
for seizures  
Objective  
PHYSICAL EXAM:  
Physical Exam   
Performed:  
GENERAL: Alert, no   
acute distress,   
cooperative, laying   
flat in bed  
SKIN: Skin color,   
texture, turgor   
normal. No rashes or   
lesions noted.  
HEAD: No significant   
findings  
EYES: EOMI, sclera   
anicteric  
OROPHARYNX: not   
visualized d/t patient   
wearing a mask  
NECK: Carotid pulse   
normal contour, Supple  
BACK: Back symmetric,   
ROM normal  
LUNGS: Lungs clear to   
auscultation, no   
respiratory distress   
or use of accessory  
muscles noted  
CARDIAC: mildly tachy  
ABDOMEN: Abdomen soft,   
non-tender  
EXTREMITIES: no edema  
NEURO: Grossly normal   
cognition and motor   
function, no focal   
neuro deficit noted  
PULSES: 2+ radial, 2+   
carotid  
/72   Pulse 108   
  Temp (Src) 97.6   
(Oral)   Resp 17   Wt   
139 lb 3.2 oz  
(63.1kg)   SpO2 100%  
O2 Therapy: Room Air  
DATA:  
Diagnostic tests   
reviewed for today's   
visit:  
Outside chart from   
[place] ED reviewed.   
-- see records in   
patient's paper chart  
Assessment/Plan  
Principal Problem:  
Headache after spinal   
puncture  
History of Chiari   
malformation  
Leukocytosis  
- s/p LP on  for   
further evaluation of   
HA/blurred vision in   
the setting of  
chiari malformation,   
had been started on   
Diamox prior to LP  
- developed positional   
HA, blurred vision and   
nausea 1 day s/p LP  
- overall HA was   
positional and   
improved with laying   
flat, now laying flat   
not  
helping as much  
- no fever or chills  
- per OSH, WBC 16.7,   
CT brain negative, UA   
appears negative for   
infection  
- was given a dose of   
ceftriaxone at OSH   
since WBC elevated  
PLAN:  
- observation  
- telemetry  
- recheck CBC and BMP  
- pain/symptom   
control, trial HA   
cocktail  
- neuro checks  
- IVF  
- anesthesia consult   
for possible blood   
patch  
- continue to monitor  
Active Problems:  
Status post placement   
of ureteral stent  
Kidney stone  
- s/p right ureteral   
stent for kidney   
stone, was scheduled   
for removal TODAY at  
OSH  
- CT A/P at OSH   
unremarkable/shows   
good placement of   
stent, UA appears   
negative  
for infection  
- continue daily   
prophylactic Keflex as   
was taking prior to   
admission  
Resolved Problems:  
* No resolved hospital   
problems. *  
edication and   
Non-Pharmacologic VTE   
Prophylaxis/Anticoagul  
ants  
20 0315   
pneumatic compression   
stockings (fl,oh)  
VTE Prophylaxis: VTE   
prophylaxis   
appropriate  
SIGNATURE: Sandy Dominique APRN.CNP   
PATIENT NAME: Lissett Ba  
DATE: 2020 MRN: [id number]  
TIME: 3:31 AM   
PAGER/CONTACT #:   
[phone]  
Darcy Gomes RN, RN   
2020 11:33 AM   
Addendum  
Nursing Progress Note  
Patient Name: Lissett Ba  
MRN: [id number]  
Patient Location:   
[id number]/[address]  
525-01  
______________________  
__  
Daily Note:  
0700-Assumed care of   
patient. Received   
report from Brisa VASQUEZ Rn. Assessment  
charted in NPR. AANDO   
x 3, pleasant and   
cooperative.VSS;   
Afebrile. Telemetry   
order  
verified and current,   
NSR on monitor, no C/O   
chest pain/discomfort.   
Neuro checks  
intact. Speech is   
clear and coherent.   
Bilateral extremities   
strong and equal  
with facial symmetry.   
Pt denies headache at   
this time. Pt does c/o   
pressure in  
upper back area that   
radiated to neck. Dr. Moon notified. Pt   
states no needs  
at this time. Will   
continue to monitor.   
Safety maintained at   
this time, call  
light and personal   
belongings in reach,   
bed locked and   
lowered.  
0947-Pt c/o headache   
and nausea after   
getting up to   
bathroom. Prn   
medication  
given per MAR.  
1100-Report given to   
Diane VASQUEZ Rn.  
This note was   
completed by: Dracy Gomes RN  
Previous Version  
ELISA HARDY   
2020 11:13 AM   
Signed  
CARE MANAGEMENT:   
ASSESSMENT AND   
DISCHARGE PLAN  
SERVICE DATE:   
2020  
SERVICE TIME: 11:00am  
PRIMARY CARE   
PHYSICIAN:  
Sandy Quintanilla CNP  
Phone: [phone]  
ADMISSION STATUS:   
Observation  
Needs Prior to   
Discharge: To Be   
Determined  
MEDICAL:  
BLUE CARD PPO  
Patient/Representative   
Stated Goals: To have   
reduction in pain;To   
have reduction  
in symptoms;To improve   
my functional status  
Health Insurance:   
[employer]  
Health Issues   
Impacting Discharge   
Plan: Newly   
diagnosed;Chronic  
Newly Diagnosed: s/p   
LP Headache, s/p   
Ureteral Stent, Kidney   
Stone  
Chronic: Chiari   
Malformation  
Last Discharge Date:  
20  
Is this Within the   
Past 30 days?  
Last discharge within   
30 days: No  
Advance Directive:  
Current Advance   
Directive: None  
Care Manager Attempted   
to Assist with AD   
Completion: Yes  
Action: Education   
Provided;Patient   
Unwilling  
Health LiteracyHow   
often do you need to   
have someone help you   
when you read  
instructions,   
pamphlets, or other   
written material from   
your doctor or   
pharmacy?  
: 1 - Never  
How confident are you   
filling out medical   
forms by yourself?: 1   
- Extremely  
If Patient scores > 3   
on either question,   
the following   
interventions were put  
into place:: Patient   
did not score > 3 on   
either question.  
Baseline Mental Status  
Prior to this Illness   
what was the patient's   
Baseline Mental   
Status?: Alert AND  
Oriented  
Prior to this illness,   
has anyone described   
the patient having any   
of the  
following behaviors?:   
Not Applicable  
Relationship of the   
informant to the   
patient:: Self  
Functional Status:   
Independent  
Does Patient Currently   
Receive Any Community   
Services or Home   
Care?: None  
Equipment Prior to   
Admission: None  
SOCIAL:  
Living Arrangements:   
Home  
Lives With:   
Partner;Other: See   
Comment(2 kids)  
Financial Resources:   
EmployedPrimary   
Contact: Extended   
Emergency Contact  
Information  
Primary Emergency   
Contact: ZARA WHITE  
Mobile Phone:   
[phone]  
Relation: Mother  
Secondary Emergency   
Contact: DANE BA  
Mobile Phone:   
[phone]  
Relation: Father  
Supportive Patient   
Contact:: Yes  
Contact Resources:   
Family  
Family Name/Phone:   
Zara - Mother  
Social Needs  
Food insecurity  
Worry: Never true  
Inability: Never true  
Resources Needed: No  
Social Needs  
Financial resource   
strain: Not very hard  
Social Needs  
Transportation needs  
Medical: No  
Non-medical: No  
Caregiver   
AssessmentCaregiver is   
ready, willing and   
able to meet the   
patient's  
needs as recommended   
by the   
inter-professional   
team:: No Caregiver   
needed  
Does the patient have   
an acute stroke   
diagnosis, or has the   
patient had a stroke  
during this   
admission?: No  
Patient's transition   
needs and plan for   
meeting these needs:   
IPTA, plan is home  
with self-care, basic   
needs  
Patient's perception   
of need for this   
admission: s/p LP   
Headache  
Medication Adherance  
I am convinced of the   
importance of my   
prescription   
medication: 0 - Agree  
Completely  
I worry that my   
prescription   
medication will do   
more harm than good to   
me : 0 -  
Disagree Completely  
I feel financially   
burdened by my   
out-of-pocket expenses   
for my prescription  
medication:: 0 -   
Disagree Completely  
Risk Score: 0  
Patient is categorized   
as: Low risk < 2  
Are you interested in   
bedside delivery of   
your medications? No  
Is Patient   
Psychosocially   
Complex?: No  
ASSESSMENT AND PLAN:  
Medical Needs:  
Medical Needs: Two or   
more chronic diseases  
Psychosocial Needs:  
Psychosocial Needs:   
None  
FREEDOM OF CHOICE   
EXPLAINED:  
Freedom of Choice   
Given: No  
Reason Not Given: No   
placements necessary  
POTENTIAL TRANSITION   
PLANS  
Home;No Services   
Indicated  
SW spoke with pt on   
ROU using COVID   
precautions. Pt is   
26yo female referred   
to  
observation due to   
headache, kidney   
stone. AANDOX4,   
independent prior to   
admission  
with ADLs and iADLs,   
lives with her 2   
children and their   
father, working. Does  
not utilize any DME,   
skilled nursing or   
community resources.   
Has d/c  
transportation via her   
children's father,   
[place]. Anticipate no   
skilled needs at  
d/c. SW/CM available   
to assist as needed   
with transition   
planning.  
SIGNATURE: ELIU HARDY PATIENT   
NAME: Lissett Ba  
DATE: 2020 MRN: [id number]  
TIME: 11:00 AM   
PAGER/CONTACT #:   
[phone]  
Diane Mcrae RN, RN   
2020 3:28 PM   
Addendum  
Nursing Progress Note  
Patient Name: Lissett Ba  
MRN: [id number]  
Patient Location: [facility]   
OR POOL/[facility] OR POOL  
______________________  
__  
Daily Note:  
1230: Pt off floor   
with transport in   
stable condition.  
1500: Pt returned to   
unit. Boyfriend   
remains at bedside.  
This note was   
completed by: Diane Mcrae RN  
Previous Version  
Yola Moon MD   
2020 3:43 PM   
Addendum  
RAPID OBSERVATION UNIT  
PROGRESS NOTE  
Name: Lissett Ba  
MRN: [id number]  
SERVICE DATE: 2020  
SERVICE TIME: 1:01 PM  
Hospital   
Medicine/Primary   
Attending: Yola Moon MD  
ASSESSMENT AND PLAN  
Patient Active   
Hospital Problem List:  
1. Headache after   
spinal puncture  
2. History of Chiari   
malformation POA: Yes  
3. Leukocytosis POA:   
Yes  
Getting blood patch at   
this time  
- discussed case with   
Dr. [facility]eta's [facility] at   
[facility]  
- plan to get MRI   
cervical spine with   
cine flow and MRI   
lumbar spine, per his  
request. He will be   
seeing her this week   
in follow up  
4. Status post   
placement of ureteral   
stent POA: Yes  
5. Kidney stone POA:   
Yes  
Will follow up   
outpatient with her   
urologist  
- on keflex  
Yola Moon MD  
Addendum:  
Just returned from   
blood patch  
- plan for MRI  
- likely observe   
overnight to make sure   
improving before   
discharging;   
especially  
considering she lives   
over an hour home.  
Yola Moon MD  
SUBJECTIVE  
INTERVAL HPI:  
Still with severe   
headache, worse with   
standing.  
MEDICATIONS: Reviewed  
OBJECTIVE  
PHYSICAL EXAM: BP   
105/54   Pulse 95     
Temp (Src) 98.4 (Oral)   
  Resp 17   Ht 5'  
3  (1.60m)   Wt 139 lb   
3.2 oz (63.1kg)   SpO2   
99%   BMI 24.66   
kg/(m2).  
O2 Therapy: Room Air  
GENERAL: alert, no   
distress, cooperative,   
appears uncomfortable  
LUNGS: Lungs clear to   
auscultation. Good   
diaphragmatic   
excursion.  
CARDIAC: normal S1 and   
S2; no rubs, murmurs,   
or gallops  
ABDOMEN: Abdomen soft,   
non-tender. BS normal.   
No masses or   
organomegaly.  
EXTREMITIES:   
Extremities normal. No   
deformities, edema,   
clubbing or skin  
discoloration., No   
ulcers  
NEURO: Reflexes normal   
and symmetric.   
Sensation grossly   
intact., Cranial   
nerves  
II-XII intact  
PULSES: 2+ radial, 2+   
carotid  
DATA:  
Diagnostic tests   
reviewed for today's   
visit:  
Most recent labs  
VTE Prophylaxis: Early   
Ambulation  
Disposition: Home  
Plan of care discussed   
with: Patient  
SIGNATURE: Yola Moon MD  
DATE: 2020  
TIME: 1:01 PM  
Previous Version  
Addy Roberts DO 2020 2:42 PM   
Signed  
Anesthesia Pain   
Service Consult Note  
PATIENT NAME: Lissett Ba  
: 1995  
MRN: [id number]  
Pain Assessment  
Pain Level  
 1136 6  
 0900 5  
 0424 10  
Acceptable level  
 1136 2  
Pain Assessment   
(RN/LPN)  
09/02 1136 Assessment  
900 Assessment  
 05   
Reassessment  
424 Assessment  
Intervention  
1136 Medication  
900   
Reposition;Relaxation  
424   
Medication;Reposition;  
Relaxation  
Most recent pain   
score: 3  
CONSULT TO PAIN   
MANAGEMENT   
(AK,AV,EU,FV,FILOMENA,MM,SP)  
Consult performed by:   
Addy Roberts  
Consult ordered by:   
Sandy Dominique  
Reason for consult:   
Epidural Blood Patch  
Assessment/Recommendat  
ions: iStatus post LP   
under fluoroscopy.  
Service Requesting   
Consult:   
Neurosurgery/Spine  
Opinion/Advice   
Regarding: Epidural   
Blood Patch s/p LP   
under fluoroscopy.  
Subjective  
Chief Complaint: Acute  
Interval HPI  
Location of Pain: Head  
Pain At Rest: 8, Pain   
Ambulation: 10  
Pain Character: Sharp,   
Stabbing, Shooting and   
Throbbing  
Pain Duration: Does   
not go away  
Pain Exacerbated By:   
sitting and standing  
Pain Relieved By:   
Lying down  
Pain Onset: Varies  
PCA  
Patient on IV PCA?: No   
,  
Block  
Candidate for Pre-Op   
Block?: Yes  
Anticipated Block   
Type: Epidural blood   
patch Block   
Laterality: Trunk  
Plan  
Analgesic options via:   
Epidural Blood Patch.  
Plan Discussed With:   
Caregiver, family and   
patient  
Patient status post LP   
under fluoroscopy   
being consulted to   
APMS for epidural  
blood patch. Epidural   
blood patch performed   
in PACU (see procedure   
note).  
Headache resolved.   
Educated Patient on   
lying flat for at   
least 2 hours and  
minimizing heavy   
lifting over 5 pounds   
and to avoid straining   
and bending over.  
APMS will sign-off at   
this time. Appreciate   
the consult.  
APS Progress Note ROS  
Sensory / Motor Exam  
Affect: alert,   
oriented to person,   
place, and time,   
awake,  
General Appearance:   
appears uncomfortable,  
Surgical Limb  
LUE: sensation intact  
RUE: sensation intact  
LLE: sensation intact  
RLE: sensation intact  
Non-Surgical Limb  
LUE: sensation intact  
RUE: sensation intact  
LLE: sensation intact  
RLE: sensation intact  
Trunk  
Abdomen: sensation   
intact  
Back: sensation intact  
Palpitation of   
abdomen: soft  
/59   Pulse 73     
Temp 36.9 ?C (98.4 ?F)   
(Oral)   Resp 13   Ht   
160 cm  
(5' 3 )   Wt 63.1 kg   
(139 lb 3.2 oz)   SpO2   
100%   BMI 24.66 kg/m?  
No intake or output   
data in the 24 hours   
ending 20 1435  
Current   
Facility-Administered   
Medications  
Medication Dose Route   
Frequency  
- acetaZOLAMIDE 250 mg   
tab(s) (DIAMOX) 250 mg   
ORAL BID  
- sodium chloride 0.9   
% (flush) 3-5 mL (BD   
POSIFLUSH) 3-5 mL   
INTRAVENOUS q 12 H  
- sodium chloride 0.9   
% (flush) 2-10 mL (BD   
POSIFLUSH) 2-10 mL   
INTRAVENOUS q 12  
H  
- ondansetron 4 mg   
tab(s) (ZOFRAN) 4 mg   
ORAL q 6 H PRN  
Or  
- ondansetron (PF) 4   
mg injection (ZOFRAN)   
4 mg INTRAVENOUS q 6 H   
PRN  
- acetaminophen 650 mg   
tab(s) (TYLENOL) 650   
mg ORAL q 6 H PRN  
- acetaminophen 325   
mg-caffeine 40   
mg-butalbital 50 mg 1   
tablet (FIORICET) 1  
tablet ORAL q 6 H PRN  
- NaCl 0.9% iv   
infusion 75 mL/hr   
INTRAVENOUS CONTINUOUS  
ALLERGIES  
Allergen Reactions  
- Dilaudid [Hydromorp*   
Intolerance  
Pt had Nausea   
/Vomitinglasted 2 days  
Lab Results:  
PT Sec 11.6 2020  
PT INR 1.1 2020  
Hemoglobin 13.6   
2020  
Hematocrit 39.5   
2020  
Platelet Count 231   
2020  
Problem List:  
*Headache after spinal   
puncture (2020)  
History of Chiari   
malformation   
(2020)  
Leukocytosis   
(2020)  
Status post placement   
of ureteral stent   
(2020)  
Kidney stone   
(2020)  
No past medical   
history on file.  
No past surgical   
history on file.  
FAMILY HISTORY  
Problem Relation Age   
of Onset  
- No Ocular Disease No   
Family History  
Social History  
Tobacco Use  
- Smoking status:   
Never Smoker  
- Smokeless tobacco:   
Never Used  
Substance Use Topics  
- Alcohol use: Never  
Frequency: Never  
- Drug use: Not on   
file  
Comment: not asked  
SIGNATURE: Addy Roberts DO  
DATE: 2020  
TIME: 2:35 PM  
Brisa Solomon RN, RN   
9/3/2020 4:58 AM   
Signed  
Nursing Progress Note  
Patient Name: Lissett Ba  
MRN: [id number]  
[facility]   
[id number]  
[id number]  
______________________  
__  
Daily Note:Pt alert,   
oriented x3, went for   
MRI Cervical AND   
Thoracic Spine, with  
recommendation to   
repeat . Medicated   
with Fioricet for   
headache at 2340 Hrs.   
Pt  
continued on IVF NS   
75ml/hr. Maintained on   
Tele with SR/ST.   
Reinforced use of  
call light. Would   
monitor.  
This note was   
completed by: JERONIMO Conway Tech   
Tech 2020 10:08 PM   
Signed  
Radiology Service   
Progress Note  
PATIENT NAME: Lissett Ba  
MRN: [id number]  
DATE OF SERVICE:   
2020  
TIME: 10:07 PM  
PATIENT IDENTITY   
VERIFICATION COMPLETED   
USING TWO (2)   
IDENTIFIERS: Name and  
Date of Birth   
confirmed by patient   
verbally and Name and   
Date of Birth   
confirmed  
by identification   
band.  
FALL SCREENING: Has   
the patient had 2   
falls in the last year   
or 1 fall with  
injury or currently   
using an Ambulatory   
Assistive Device   
(Walker, Cane,  
Wheelchair, Crutches,   
etc.)? Inpatient:   
Screened on floor  
PATIENT GENDER DATA:   
Female. Pregnancy   
status: Pregnant: No   
Breastfeeding  
status: NO.  
PATIENT RELEVANT   
IMPLANT DATA REVIEWED:   
Yes  
RADIOLOGY DEPARTMENT:   
MR; Exam(s) Completed:   
Spine: Cervical spine,   
Thoracic  
spine and cine  
PERIPHERAL IV DATA:   
Not applicable  
SIGNED BY: Vilma Guerrero  
2020   
10:07 PM  
Gladis Lake RN, RN 9/3/2020 8:56   
AM Signed  
Nursing Progress Note  
Patient Name: Lissett Ba  
MRN: [id number]  
Patient Location:   
[id number]/[address]-0  
525-01  
______________________  
__  
Daily Note:0800 Pt is   
AANDOX3. C/O HA 3/10.   
Up with SBA. Steady   
gait. Tolerated  
clear liquids well. No   
N/V. Pt remains safe.   
Continue care plan.  
This note was   
completed by: Gladis Lake, JERONIMO Moon MD   
9/3/2020 11:23 AM   
Signed  
[facility] OBSERVATION UNIT  
PROGRESS NOTE  
Name: Lissett Ba  
MRN: [id number]  
SERVICE DATE: 9/3/2020  
SERVICE TIME: 11:20 AM  
Hospital   
Medicine/Primary   
Attending: Yola Moon MD  
ASSESSMENT AND PLAN  
Patient Active   
Hospital Problem List:  
1. Headache after   
spinal puncture  
2. History of Chiari   
malformation POA: Yes  
3. Leukocytosis POA:   
Yes  
LP headache improved   
with blood patch  
- headache from her   
chiari   
malformation/benign   
intracranial   
hypertension back  
- trial of headache   
cocktail  
- MRI of Cervical   
spin/thoracic spine   
with normal flow, no   
acute findings. Will  
review results with   
Dr. Argueta's team.  
4. Status post   
placement of ureteral   
stent POA: Yes  
5. Kidney stone POA:   
Yes  
No inpatient   
management  
Yola Moon MD  
SUBJECTIVE  
INTERVAL HPI:  
Pt states headache   
from LP is improved.   
Able to get up and   
move around without  
increased headache   
now. However, pt   
started to get severe   
headache when she went  
to lay down for her   
MRI. It is typical for   
her headaches that she   
usually gets.  
It has not really   
improved overnight.  
MEDICATIONS: Reviewed  
OBJECTIVE  
PHYSICAL EXAM: BP   
108/59   Pulse 69     
Temp (Src) 98.5 (Oral)   
  Resp 18   Ht 5'  
3  (1.60m)   Wt 139 lb   
3.2 oz (63.1kg)   SpO2   
100%   BMI 24.66   
kg/(m2).  
O2 Therapy: Room Air  
GENERAL: alert, no   
distress, cooperative  
LUNGS: Lungs clear to   
auscultation. Good   
diaphragmatic   
excursion.  
CARDIAC: normal S1 and   
S2; no rubs, murmurs,   
or gallops  
ABDOMEN: Abdomen soft,   
non-tender. BS normal.   
No masses or   
organomegaly.  
EXTREMITIES:   
Extremities normal. No   
deformities, edema,   
clubbing or skin  
discoloration., No   
ulcers  
NEURO: Reflexes normal   
and symmetric.   
Sensation grossly   
intact., Cranial   
nerves  
II-XII intact  
PULSES: 2+ radial, 2+   
carotid  
DATA:  
Diagnostic tests   
reviewed for today's   
visit:  
Most recent labs  
VTE Prophylaxis: Early   
Ambulation  
Disposition: Home  
Plan of care discussed   
with: Patient  
SIGNATURE: Yola Moon MD  
DATE: September 3,   
2020  
TIME: 11:20 AM  
Zulay Reynoso, RN,   
RN 2020 12:09 AM   
Signed  
Nursing Progress Note  
Patient Name: Lissett Ba  
MRN: [id number]  
Patient Location:   
[address]-  
525-  
______________________  
__  
Daily Note:Pt resting   
in bed C/o headache   
pain. Adm headache   
cocktail as  
ordered. Resp even and   
unalbored. 0 distress   
noted. Will cont to   
monitor.  
This note was   
completed by: Zulay Reynoso, RN  
Sean Pardo, RN, RN   
2020 2:12 PM   
Addendum  
Nursing Progress Note  
Patient Name: Lissett Ba  
MRN: [id number]  
Patient Location:   
[id number]/DU-  
525-  
______________________  
__  
Daily Note:  
0845-Pt assessment   
complete. Pt AANDOX3.   
Pt denies   
CP,SOB,NANDV,numbness,  
tingling,  
or dizziness. Pt   
states she continues   
to have a constant   
headache. Pt requests  
to be given her MRI   
results. My safety   
plan discussed, pt   
verbalizes  
understanding. Pt   
updated on POC. Call   
light in reach.   
Continuous telemetry  
applied.  
1400-This RN spoke   
with Dr. Miller,   
discussed POC.  
1408-Dr. Lucero at   
bedside.  
This note was   
completed by: Sean Pardo RN  
Previous Version  
Aaron Miller DO   
2020 12:51 PM   
Signed  
ROU PROGRESS NOTE  
Name: Lissett Ba  
MRN: [id number]  
SERVICE DATE: 2020  
SERVICE TIME: 12:48 PM  
Hospital   
Medicine/Primary   
Attending: Aaron Miller DO  
ASSESSMENT AND PLAN  
*Headache after spinal   
puncture (2020):   
blood patch placed.   
Initially it  
helped her headache.   
Her headache worsened   
after lying flat in   
the MRI scanner.  
No relief over the   
past 24 hours.  
History of Chiari   
malformation   
(2020): consult Dr Argueta.  
Leukocytosis   
(2020): repeat   
CBC.  
Status post placement   
of ureteral stent   
(2020): patient   
was scheduled to  
have the ureteral   
stent removed   
yesterday but she was   
unable to have it done  
because she was in the   
hospital.  
SUBJECTIVE  
INTERVAL HPI: I don't   
feel any better.  
MEDICATIONS: Reviewed  
OBJECTIVE  
PHYSICAL EXAM: BP   
117/63   Pulse 87     
Temp (Src) 98.6 (Oral)   
  Resp 16   Ht 5'  
3  (1.60m)   Wt 139 lb   
3.2 oz (63.1kg)   SpO2   
99%   BMI 24.66   
kg/(m2).  
O2 Therapy: Room Air  
GENERAL: alert, no   
distress  
SKIN: No acute rashes  
LUNGS:  
CARDIAC:  
ABDOMEN: Abdomen soft,   
non-tender. BS normal.   
No masses or   
organomegaly.  
EXTREMITIES:   
Extremities normal. No   
deformities, edema,   
clubbing or skin  
discoloration.  
NEURO: Grossly normal   
cognition, motor   
function, and cranial   
nerves III-XII  
DATA:  
Diagnostic tests   
reviewed for today's   
visit:  
Most recent labs  
Most recent imaging  
VTE Prophylaxis: Early   
Ambulation  
Disposition: Home  
Plan of care discussed   
with: Patient and RN  
SIGNATURE: Aaron Miller DO  
DATE: 2020  
TIME: 12:48 PM  
Bess Triplett PA-C   
2020 2:05 PM   
Cosign Needed  
NEUROSURGERY INPATIENT   
CONSULT  
SERVICE DATE: 2020  
SERVICE TIME: 1400  
PCP: Sandy Quintanilla,   
CNP  
Consultation requested   
by Hany Uribe MD  
[address] [address] for   
an opinion regarding   
the evaluation and   
treatment of  
headache. My final   
impression and   
recommendations will   
be communicated back   
to  
the requesting   
physician by way of   
the shared medical   
record or letter via   
[place]  
mail.  
SUBJECTIVE  
Lissett Ba is a 25   
year old female   
presenting with   
significant other  
CHIEF   
COMPLAINT:headache  
HISTORY OF PRESENT   
ILLNESS  
PRECIPITATING EVENT:   
LP  
Headache for years.  
After recent LP she   
developed low pressure   
HA  
Was transferred here   
from OSH for blood   
patch  
Initially improved   
after blood patch  
Then after MRI HA   
returned  
Throbbing over   
occipital and frontal   
lobes constantly now  
She feels like she is   
going to pass out when   
she stands up  
Caffeine occasionally   
helps her headaches,   
but has not tried it   
this admission  
Denies   
numbness/tingling  
No diplopia or blurred   
vision  
PAIN EVALUATION  
2020  
0424 2020  
0900 2020  
1136 9/3/2020  
0558 9/3/2020  
0806  
Pain Level: 10 5 6 6 3  
Pain Location: Head   
Back-Upper Back-Upper   
Head Head  
Description: Aching   
Pressure Pressure   
Throbbing Throbbing  
Intervention:   
Medication;Reposition;  
Relaxation   
Reposition;Relaxation  
Medication   
Medication;Reposition;  
Relaxation Cold  
9/3/2020  
1007 9/3/2020  
1045 9/3/2020  
1442 9/3/2020  
2357 2020  
0004  
Pain Level: 5 4 7 7 6  
Pain Location: Head   
Head Head Head Head  
Description: Aching   
Aching Aching   
Throbbing Throbbing  
Intervention:   
Medication Cold   
Medication Medication   
Medication  
2020  
0855  
Pain Level: 5  
Pain Location: Head  
Description: Aching  
Intervention:   
Medication;Reposition;  
Relaxation;Education  
AMBULATORY STATUS:   
[facility]  
ANTIPLATELET OR   
ANTICOAGULATION   
STATUS: No  
PREVIOUS CONSERVATIVE   
TREATMENTS:  
Analgesics  
ACTIVE PROBLEM LIST  
Headache After Spinal   
Puncture  
History of Chiari   
Malformation  
Leukocytosis  
Status Post Placement   
of Ureteral Stent  
Kidney Stone  
No past medical   
history on file.  
No past surgical   
history on file.  
FAMILY HISTORY  
Problem Relation Age   
of Onset  
- No Ocular Disease No   
Family History  
Social History  
Tobacco Use  
- Smoking status:   
Never Smoker  
- Smokeless tobacco:   
Never Used  
Substance Use Topics  
- Alcohol use: Never  
Frequency: Never  
- Drug use: Not on   
file  
Comment: not asked  
ALLERGIES  
Allergen Reactions  
- Dilaudid [Hydromorp*   
Intolerance  
Pt had Nausea   
/Vomitinglasted 2 days  
MEDICATIONS:  
ondansetron orally   
disintegrating (ZOFRAN   
ODT) 4 mg   
disintegrating tablet   
Take 4  
mg by mouth every 8   
hours as needed.  
cephALEXin (KEFLEX)   
500 mg capsule Take   
500 mg by mouth once   
daily.  
acetaZOLAMIDE (DIAMOX)   
250 mg tablet Take 1   
tablet by mouth twice   
daily.  
oxybutynin ER   
(DITROPAN XL) 10 mg 24   
hr tablet Take 10 mg   
by mouth once daily.  
ketorolac (TORADOL) 10   
mg tablet Take 10 mg   
by mouth every 8 hours   
as needed.  
REVIEW OF SYSTEMS:  
PAIN ASSESSMENT: See   
HPI.  
GENERAL: Denies fever,   
chills malaise and   
weight loss.  
HEENT: No recent   
change in vision or   
hearing.  
CARDIOVASCULAR: Denies   
chest pain, history of   
A-fib, valvular   
disease, or  
pacemaker/ICD.  
RESPIRATORY: Denies   
SOB, sputum   
production, and   
hemoptysis.  
GI: Denies GI ulcers,   
inflammatory disease,   
or liver disease.  
: Denies change in   
frequency or urgency,   
kidney disease, and   
burning with  
urination.  
MUSCULOSKELETAL:   
Negative for joint   
pain or swelling, back   
pain or muscle pain.  
SKIN: Denies rash or   
itching.  
PSYCHOLOGICAL: Not   
reviewed  
NEURO: Headaches  
ENDOCRINE: Denies   
diabetes, thyroid   
disease.  
HEMATOLOGY/LYMPHOLOGY:   
Denies cancer,   
bleeding or clotting   
disorders, anemia,and  
DVT's.  
ALLERGIC/IMMUNOLOGICAL  
: Denies risks for   
infection, or recent   
MRSA infections.  
OBJECTIVE:  
PHYSICAL EXAM  
/63   Pulse 87     
Temp 37 ?C (98.6 ?F)   
(Oral)   Resp 16   Ht   
160 cm (5'  
3 )   Wt 63.1 kg (139   
lb 3.2 oz)   SpO2 99%   
  BMI 24.66 kg/m?  
GENERAL APPEARANCE:   
Well nourished, well   
developed, and no   
apparent distress.  
NEURO PSYCH: Patient   
oriented to person,   
place, and time. Mood   
pleasant. Benign  
affect.  
MUSCULOSKELETAL  
VISUAL INSPECTION  
CERVICAL: WNL  
THORACIC: WNL  
LUMBAR: WNL  
MOTOR: 5/5 in all   
muscle groups.  
SENSORY: Normal   
sensory exam  
No swelling over   
lumbar spine  
NEURO TESTS:  
Cranial Nerves:  
Normal mood and   
affect.  
CNII-XII grossly   
intact.  
No dysmetria  
DATA REVIEW  
[facility] records reviewed  
Imaging and outside   
records reviewed  
Images reviewed with   
the patient  
ASSESSMENT/PLAN  
IMPRESSION:  
Lissett Ba is a 25   
year old female who   
presented due to low   
pressure  
headaches following LP   
for blood patch. She   
has been seeing Dr. Argueta for  
tonsillar decent. Her   
workup has been   
negative for IIH. Her   
cervical cine flow  
demonstrated good flow   
anteriorly and   
posteriorly. Patient's   
father requested  
neurosurgery see her   
during admission.  
Will discuss   
possibility of second   
blood patch with   
anesthesia, if they   
are in  
agreement would   
recommend repeat blood   
patch  
Recommend   
resting/taking it easy   
for the next 2 weeks  
Lie flat with feet up   
to improve headaches  
Increase fluids  
Follow up with   
headache neurology for   
headache management;   
consult has been sent  
per ZACK Gimenez  
Follow up with Dr. Argueta in 6 m with   
repeat MRI (20)  
This has been   
communicated to   
patient's father by   
Dr. Argueta over   
phone.  
We will sign off.  
SIGNATURE: Bess Triplett PA-C  
PATIENT NAME: Lissett Ba  
DATE: 2020 MRN: [id number]  
TIME: 1:31 PM PAGER:  
Progress Notes   
(NEUROLOGICAL   
INSTITUTE):  
Elaine Asher [facility]   
2020 1:20 PM   
Signed  
Patient called; states   
that she had lumbar   
puncture yesterday;   
states about one  
hour ago she started   
having severe head   
pain, feels like her   
ears are blocked;  
she is very nauseated   
and having difficulty   
moving her neck;   
patient is in  
severe discomfort;   
requesting call back   
asap; ph. [phone]  
[place] [address]   
2020 1:55 PM   
Signed  
Patient's father   
called to say the   
patient was being   
taken to the hospital  
because she was   
experiencing intense   
pain. They were hoping   
to hear back from  
Dr. Argueta's office   
very soon.  
Isabella Gimenez PA-C   
2020 2:35 PM   
Signed  
Called number below.   
Pt is in ED. She is   
having severe ha, n/v   
ears blocked.  
She had an LP   
yesterday OP of 11. No   
papilledema by ophth   
exam. Mom concerned  
because her chiari   
went from 4.4 mm to 6   
mm. I explained that   
the two images  
really are not   
significantly changed.   
Measurements can vary   
based on who does  
them. I assured them I   
would have Dr. Argueta   
review everything to   
make sure  
that there is nothing   
more that he wants to   
Do prior to his appt   
with her. They  
appreciated the call.   
In the meantime I   
advised lying flat   
with feet elevated.  
Isabella Gimenez PA-C   
2020 11:23 AM   
Signed  
Spoke with Dr. Argueta. Wanted to get   
additional MRI   
cervical with cine   
flow and  
mri thoracic. Have   
done prior to appt   
next week.  
Isabella Gimenez PA-C   
2020 12:00 PM   
Signed  
Called patient. She   
was transferred to   
[place]. Lying flat   
is good. Her back  
is hurting as well. I   
dw patient that dr. Argueta wants an MRI   
cervical with  
cine flow and   
thoracic. I messaged   
Dr. Moon to let her   
know this. Hopefully,  
she can have this done   
while she is in house.  
Rossy Godfrey 2020   
8:54 AM Signed  
Patient's father   
called very upset   
saying that patient   
was taken to  per   
the  
office's yet no one   
has come seen her   
daughter. Daughter   
called dad saying that  
she was in a room all   
day and no one came to   
get her until she   
needed her IV  
changed because it was   
bleeding. He states   
that they thought they   
were getting  
ready for patient to   
have surgery so why   
has no one come to   
speak to them  
regarding that. He   
states that he wants   
someone to call him   
with an update on  
patient. He states   
that patient had the   
MRI that were ordered   
by Dr. Argueta. He  
states they are all   
concerned on the   
status of the patient.   
Patient's daughter,  
Dane can be reached at   
[phone].  
Isabella Gimenez PA-C   
2020 11:21 AM   
Signed  
Dr. Argueta called   
patient's father. She   
has early form of   
chiari. There is  
debate whether this   
needs treatment. IIh   
work up has been   
negative. Neg  
ophthalmology,   
negative LP OP 11,   
negative MRV. Mri cerv   
no syrinx, no syrinx   
in  
thoracic. Assume   
chiari is congenital   
in nature. Cervical   
cine flow anterior  
and posterior good   
flow. Between 0-5mm   
descent, this is a   
variation of normal.  
Between 5-10mm there   
is question of whether   
she needs surgery. We   
don't think  
that she is in any   
danger, but she could   
be quite symptomatic.   
Would recommend  
headache relief from   
neurology, headache.   
Recommend f/u in 6 mos   
with MRI  
cervical with cine   
flow. This current   
admission was for   
blood patch s/p LP.  
She has had this done.   
Recommend that she   
take it easy x 2   
weeks, lying flat  
and elevating feet.   
See headache neurology   
and f/u with Dr. Argueta in 6 mos.  
Isabella Gimenez PA-C   
2020 2:23 PM   
Signed  
Dr Argueta called and   
spoke with patient on   
the phone. Explained   
what he  
explained to her   
father. She should   
follow up in 6 mos.   
She can see headache  
specialists.        Normal                                  [facility]  
   
                                                    IR LUMBAR PUNCTURE DIAGon    
   
                                                    IR LUMBAR PUNCTURE   
DIAG                                    * * *Final Report* * *  
DATE OF EXAM: Aug 31   
2020 2:45PM  
[facility] [id number] - IR LUMBAR   
PUNCTURE DIAG /   
ACCESSION # [id number]  
PROCEDURE REASON:   
INTRACRANIAL   
HYPERTENTION  
  
* * * * Physician   
Interpretation * * * *  
PROCEDURE: Diagnostic   
Lumbar Puncture Under   
Fluoroscopic Guidance  
HISTORY: The patient   
is a 25 years year old   
Female who presented   
with  
history of Elizabeth I   
malformation and   
headaches concern for   
benign  
intracranial   
hypertension .  
Consent: The risks,   
benefits, treatment   
options, potential  
complications,   
equipment, and   
personnel to be   
involved were   
discussed  
(including the risks   
of radiation exposure,   
contrast and   
anesthesia  
administration) with   
the patient. All of   
her questions were   
answered and  
consent was obtained.   
The patient indicated   
she was willing to   
proceed.  
General:  
A) Medication   
Reconciliation: The   
patient's medications   
and allergies  
were reviewed in the   
electronic medical   
record and reconciled   
to the  
proposed   
procedure/treatment.  
B) Pre-Procedure   
Medications:  
Medication #1: None  
C) Positioning: The   
patient was placed   
Prone on the   
fluoroscopy table.  
D) The Lumbar dorsal   
soft tissues. were   
then sterile prepped   
and draped.  
E) Time Out: A time   
out was performed   
immediately prior to   
procedure  
start with the   
nursing, anesthesia   
and interventional   
team, correctly  
identifying the name,   
medical record number,   
procedure, anatomy  
(including marking of   
site and side),   
patient position,   
procedure consent  
form, relevant   
diagnostic and   
radiology test   
results, antibiotic  
administration, safety   
precautions, and   
procedure-specific   
equipment  
needs.  
F) Anesthesia Type:   
administration of   
local anesthesia.  
Local Anesthesia: 5 mL   
1% Lidocaine  
G) Anesthesia Was   
Administered For A   
Total Of None.  
H) Patient Monitoring:   
I personally monitored   
the patient?s level of  
consciousness and   
physiological status   
throughout the   
procedure.  
TECHNIQUE/RESULT:  
A) Access Site: 22   
gauge spinal needle   
from a left paramedian  
approach at the L3-L4   
level.  
B) Counting reference:   
Lumbosacral junction.   
For the purposes of   
this  
report, L4-5 is   
considered the level   
of the iliac crest.  
C) Procedure Details:   
Using sterile   
procedure, local   
anesthesia was  
introduced to the skin   
and subcutaneous   
tissues as outlined   
above.  
Diagnostic LP Details:   
Under fluoroscopic   
guidance, the needle   
was  
carefully advanced   
into the lumbar   
subarachnoid space   
resulting in free  
flow of CSF.  
Opening pressure was   
recorded at 11 cm H2O  
CSF Color: Clear  
D) Estimated Blood   
Loss: 0 mL  
E) Type of Removed   
Specimens: None  
F) Number of   
Specimens: None  
Fluoroscopic Radiation   
Summary:  
Fluoroscopic guidance   
was performed in   
conjunction with the   
radiology  
technician.  
Plane A, Air Kerma:   
1.0 mGy  
Dose Area Product   
(DAP): 1052.2 mGy*cm2  
Fluoro time: 0:12 min:   
sec  
Post-Procedure:  
Conclusion: The   
patient was   
transferred to the   
Radiology Recovery   
Room  
in stable condition   
and observed for   
approximately 60   
minutes.  
Immediate   
Complications:  
None.  
Delayed Complications   
(will be reported as   
an addendum to the   
original  
report):  
None apparent at this   
time  
Timeout Time and   
Procedure Start Time:   
1438  
Procedure End Time and   
Sign Out Time: 1442  
IMPRESSION:  
TECHNICALLY SUCCESSFUL   
DIAGNOSTIC LUMBAR   
PUNCTURE.  
The procedure was   
performed by: AB Mcclellan CNP  
: DANIEL  
Transcribe Date/Time:   
Aug 31 2020 4:07P  
Dictated by : NAIMA ROGERS CNP  
This examination was   
interpreted and the   
report reviewed and  
electronically signed   
by:  
NAIMA ROGERS CNP on   
Aug 31 2020 4:10PM EST  
[id number] [facility]  
   
                                                    PT EDon 2020   
   
                                        PT ED               HNO ID: [id number]  
Author: Cindy Gonzalez)   
JERONIMO Pfeiffer  
Service: Radiology  
Author Type:   
Registered Nurse  
Type: Patient   
Education  
Filed: 2020 2:37   
PM  
Note Text:  
AMBULATORY PATIENT   
EDUCATION  
TOPIC: procedure:   
lumbar puncture  
READINESS TO LEARN  
COGNITIVE ABILITY:   
Alert and oriented  
MOTIVATION TO LEARN:   
Eager  
FAMILY SUPPORT: Unable   
to assess - Family not   
present  
INSTRUCTION PROVIDED   
TO: Patient  
PATIENT LEARNS BEST   
BY: Individual   
Instruction  
Verbal Instruction  
FACTORS AFFECTING   
LEARNING: None  
PHYSICAL LIMITATIONS   
AFFECTING LEARNING:   
None  
LEARNING RESPONSE  
DIAGNOSIS: diagnostic   
lumbar puncture  
METHOD OF INSTRUCTION:   
Individual instruction  
Verbal instruction  
PATIENT / FAMILY   
RESPONSE: Verbalizes   
understanding of:   
POST-PROCEDURE  
INSTRUCTIONS-Correct   
actions to take to   
reduce post procedure  
complications  
PRE-PROCEDURE   
INSTRUCTIONS-Correct   
action to take to   
follow pre-procedure  
instructions  
FOLLOW-UP PLAN:   
Patient instructed to   
call with any further   
issues  
Follow-up with Primary   
Care  
Contact information   
given.  
SUPPLEMENTAL MATERIAL:   
Title of written   
material: after a   
lumbar  
puncture in the   
imaging institute  
REFERRAL   
(RECOMMENDATION): None  
Electronically Signed   
By: Cindy Pfeiffer RN   
In Department: [facility]  
INTERVENTIONAL   
RADIOLOGY           [facility] 2020   
   
                                        MARIS                Telephone (IRRFV)  
----------------------  
--------------  
LISSETT BA   
([id number]) 1995   
F  
Date Time Provider   
Department  
20 JUANCHO DALLAS (TECH) IRRFV  
During your visit   
today, we recorded the   
following information   
about you:  
Allergies As of Date:   
2020  
(No Known Allergies)  
Date Reviewed:   
2020  
Reviewed by: Terry   
K Stauffer - Fully   
Assessed  
Reason for Visit:  
Reminder Call [[id number]]   
Cmt: called and gave   
reminder for patient's   
upcoming  
Lumbar Puncture.   
Patient verbalzied   
understanding  
of all instructions.  
Prescriptions as of   
2020 Sig:  
ONDANSETRON 4 MG   
DISINTEGRATI* Take 4   
mg by mouth every 8   
ho*  
OXYBUTYNIN CHLORIDE ER   
10 MG * Take 10 mg by   
mouth once juventino*  
KETOROLAC 10 MG TABLET   
Take 10 mg by mouth   
every 8 h*  
CEPHALEXIN 500 MG   
CAPSULE Take 500 mg by   
mouth once christin*  
ACETAZOLAMIDE 250 MG   
TABLET Take 1 tablet   
by mouth twice *  
Problem List As Of   
Date: 2020  
(None)  
Encounter Number:   
[id number]  
Encounter   
Status:Closed by   
JUANCHO MAHARAJ   
on 20          [facility]  
   
                                                    Irene 2020   
   
                                        CNPN                Telephone (NEADFV)  
----------------------  
--------------  
LISSETT BA   
([id number]) 1995   
F  
Date Time Provider   
Department  
20 ISABELLA GIMENEZ) NEADFV  
During your visit   
today, we recorded the   
following information   
about you:  
Elaine Peterson Cristal   
2020 10:28 AM   
Signed  
Patient is scheduled   
to have the MRV today   
at 5:20PM at the [facility].  
Patient states that   
she has not been able   
to work yesterday or   
today due to the  
pressure being so   
high. States that is   
getting the medicine   
to bring the  
pressure down, and is   
a little better, but   
still unable to go to   
work today.  
Wonders if she can get   
a note to have   
yesterday and today   
off, she is around  
the main campus today   
and can stop by to   
pick it up.  
Elaine Peterson Cristal Gimenez PA-C   
2020 11:48 AM   
Signed  
We can write a letter   
keeping her off   
yesterday, today and   
the rest of the  
week. I have drafted a   
letter. If she can   
 on S70 today   
that would be  
great.  
Ghada Connor RN,   
RN 2020 12:09 PM   
Signed  
Neuro SP[facility] CARE   
COORDINATION QUICK   
NOTE  
LVM for patient re:   
letter.  
Printed and placed on   
S70 for pickup and   
released to pts my   
chart.  
Ghada Connor RN  
Allergies As of Date:   
2020  
(No Known Allergies)  
Date Reviewed:   
2020  
Reviewed by: Terry Stauffer - Fully   
Assessed  
Reason for Visit:  
Patient Update [1234]  
Prescriptions as of   
2020 Sig:  
X ONDANSETRON 4 MG   
DISINTEGRATI* Take 4   
mg by mouth every 8   
ho*  
X OXYBUTYNIN CHLORIDE   
ER 10 MG * Take 10 mg   
by mouth once juventino*  
X KETOROLAC 10 MG   
TABLET Take 10 mg by   
mouth every 8 h*  
X CEPHALEXIN 500 MG   
CAPSULE Take 500 mg by   
mouth once christin*  
X ACETAZOLAMIDE 250 MG   
TABLET Take 1 tablet   
by mouth twice *  
Problem List As Of   
Date: 2020  
(None)  
Letter Text  
Encounter Number:   
[id number]  
Encounter   
Status:Closed by   
ELAINE FAULKNER on   
20             [facility]  
   
                                                    CNPNon 2020   
   
                                        CNPN                Telephone (IRRFV)  
----------------------  
--------------  
LISSETT BA   
([id number]) 1995   
F  
Date Time Provider   
Department  
20 JUANCHO DALLAS (TECH) IRRFV  
During your visit   
today, we recorded the   
following information   
about you:  
Allergies As of Date:   
2020  
(No Known Allergies)  
Date Reviewed:   
2020  
Reviewed by: Terry Stauffer - Fully   
Assessed  
Reason for Visit:  
Appointment [186] Cmt:   
attempted to reach   
patient multiple times   
to discuss  
arrivial time for her   
lumbar puncture. No   
answer,  
unable to leave   
voicemail due to box   
being full.  
Prescriptions as of   
2020 Sig:  
ONDANSETRON 4 MG   
DISINTEGRATI* Take 4   
mg by mouth every 8   
ho*  
OXYBUTYNIN CHLORIDE ER   
10 MG * Take 10 mg by   
mouth once juventino*  
KETOROLAC 10 MG TABLET   
Take 10 mg by mouth   
every 8 h*  
CEPHALEXIN 500 MG   
CAPSULE Take 500 mg by   
mouth once christin*  
ACETAZOLAMIDE 250 MG   
TABLET Take 1 tablet   
by mouth twice *  
Problem List As Of   
Date: 2020  
(None)  
Encounter Number:   
[id number]  
Encounter   
Status:Closed by   
[facility] MAHARAJ   
[facility]   
   
                                        CNOV                Office Visit (NSFRVW  
)  
----------------------  
--------------  
LISSETT BA   
([id number]) 1995   
F  
Date Time Provider   
Department  
20 11:50 AM   
ISABELLA GIMENEZ)   
NSFRVW  
During your visit   
today, we recorded the   
following information   
about you:  
Temperature Pulse   
Respiration Blood   
pressure  
97.8 degrees 74/minute   
16/minute 119/75  
Weight  
65.8 kg  
Isabella Gimenez PA-C   
2020 12:32 PM   
Addendum  
This note was created   
using [employer].  
Subjective  
Lissett Ba is a 25   
year old female here   
for evaluation of head   
pain, eye  
pain, visual   
disturbances and   
chiari malformation.   
Pt is c/o issues for   
over a  
year. She feels that   
it is unbearable. She   
has nausea with some   
vomiting.  
Pressure in the back   
of the throat, weight   
hanging from back of   
the head  
 forcing it down . She   
has this from the time   
she wakes up till she   
goes to  
bed. She has black   
spots and blurry   
vision. She has not   
been to the  
ophthalmologist. She   
has severe eye pain.   
She has tried imitrex,   
fioricet,  
diclofenac,   
tizanidine. Tylenol,   
motrin and zofran.   
Nothing is helping the  
pain. Lights, sleeping   
flat makes it worse.   
She feels like she has   
to keep  
moving, she can not   
lay flat.  
Review of Systems  
Constitutional:   
Negative for fatigue   
and fever.  
HENT: Positive for   
trouble swallowing.   
Negative for tinnitus.  
Eyes: Positive for   
pain and visual   
disturbance.  
Respiratory: Negative   
for cough and   
wheezing.  
Cardiovascular:   
Positive for chest   
pain. Negative for leg   
swelling.  
Gastrointestinal:   
Positive for nausea   
and vomiting.  
Endocrine: Positive   
for heat intolerance.   
Negative for cold   
intolerance.  
Genitourinary:   
Positive for frequency   
and urgency.  
Musculoskeletal:   
Positive for neck   
pain. Negative for   
gait problem.  
Allergic/Immunologic:   
Negative for   
environmental   
allergies and food   
allergies.  
Neurological: Positive   
for dizziness and   
headaches.  
Psychiatric/Behavioral  
: Negative for   
dysphoric mood. The   
patient is not  
nervous/anxious.  
Objective  
/75   Pulse 74     
Temp 36.6 ?C (97.8 ?F)   
  Resp 16   Wt 65.8 kg   
(145  
lb)  
Physical Exam  
Constitutional:  
Appearance: Normal   
appearance. She is   
normal weight.  
HENT:  
Head: Normocephalic   
and atraumatic.  
Eyes:  
Extraocular Movements:   
Extraocular movements   
intact.  
Conjunctiva/sclera:   
Conjunctivae normal.  
Pulmonary:  
Effort: Pulmonary   
effort is normal. No   
respiratory distress.  
Skin:  
General: Skin is warm   
and dry.  
Neurological:  
General: No focal   
deficit present.  
Mental Status: She is   
alert and oriented to   
person, place, and   
time.  
GCS: GCS eye subscore   
is 4. GCS verbal   
subscore is 5. GCS   
motor subscore is  
6.  
Motor: Motor function   
is intact. No weakness   
or pronator drift.  
Coordination: Romberg   
sign negative.   
Coordination normal.  
Gait: Gait and tandem   
walk normal.  
Psychiatric:  
Mood and Affect: Mood   
normal.  
Behavior: Behavior   
normal.  
Assessment and Plan  
Severe debilitating   
headaches and pressure  
Visual blackouts  
N/v.  
MRI shows 4.5 mm   
tonsillar descent  
Imaging and report   
reviewed  
CT reviewed  
Small ventricles  
Tried multiple   
medications with no   
improvement.  
Symptoms worsening.  
Recommend stat IR LP   
for OP  
Ophthalmology advised   
to try to get same day   
appt.  
MRV to evaluate for   
venous stenosis  
Diamox 250mg BID  
Pt is on toradol. May   
not be able to have LP   
until off toradol.  
Recommend First   
available appt with   
Dr. Argueta after work   
up complete.  
Pt understood.  
Referring Provider:   
SANDY QUINTANILLA   
[[id number]]  
Allergies As of Date:   
2020  
(No Known Allergies)  
Date Reviewed:   
2020  
Reviewed by: Judy Mandel Ma - Fully   
Assessed  
Reason for Visit:  
New Patient [172]  
Primary Visit   
Diagnosis:Chiari   
malformation type I   
(HCC) [G93.5]  
Other Visit   
Diagnosis:Benign   
intracranial   
hypertension [G93.2]  
Order(s):IR LP FOR   
DRAINAGE (PRESSURE)   
[5158359] Order #:   
[id number]  
MRV BRAIN WO IVCON   
[6781615] Order #:   
[id number] FUTURE  
CONSULT TO   
OPHTHALMOLOGY [9024]   
Order #:   
[id number]: 1   
FUTURE  
acetaZOLAMIDE (DIAMOX)   
250 mg tabletTake 1   
tablet by mouth twice  
daily.Disp: 60   
tabletRfl: 1  
PRE-PROCEDURE AND   
PRE-OPERATIVE COVID   
[SQPOCOVD] Order #:   
[id number]  
FUTURE  
CBC [SQCBC] Order #:   
[id number] FUTURE  
BASIC METABOLIC PNL   
[SQBMP] Order #:   
[id number] FUTURE  
PROTHROMBIN TIME/PT   
[SQPT] Order #:   
[id number] FUTURE  
Prescriptions as of   
2020 Sig:  
ONDANSETRON 4 MG   
DISINTEGRATI* Take 4   
mg by mouth every 8   
ho*  
OXYBUTYNIN CHLORIDE ER   
10 MG * Take 10 mg by   
mouth once juventino*  
KETOROLAC 10 MG TABLET   
Take 10 mg by mouth   
every 8 h*  
CEPHALEXIN 500 MG   
CAPSULE Take 500 mg by   
mouth once christin*  
ACETAZOLAMIDE 250 MG   
TABLET Take 1 tablet   
by mouth twice *  
Problem List As Of   
Date: 2020  
(None)  
Prescriptions ordered   
this encounter Disp   
Refills Start End  
ACETAZOLAMIDE 250 MG   
TABLET 60 t* 1   
2020  
Route: ORAL  
Sig: Take 1 tablet by   
mouth twice daily.  
Follow-up and   
Disposition History   
Recorded  
Encounter Number:   
[id number]  
Encounter   
Status:Closed by   
ISABELLA GIMENEZ PA-C on   
20             [facility]  
   
                                                    HOSPon 2020   
   
                                        HOSP                Patient:Gene Ba  
en  
MRN:  
Height:No patient   
height recorded for   
this patient.  
Weight:145 lb (65.772   
kg)  
Outpatient Medications   
as of 20:  
ondansetron orally   
disintegrating (ZOFRAN   
ODT) 4 mg   
disintegrating tablet  
oxybutynin ER   
(DITROPAN XL) 10 mg 24   
hr tablet  
ketorolac (TORADOL) 10   
mg tablet  
cephALEXin (KEFLEX)   
500 mg capsule  
acetaZOLAMIDE (DIAMOX)   
250 mg tablet  
Admission/Clinic   
Administered   
Medications as of   
20:  
Patient has no   
admission medications.  
Problem List:  
No problem list on   
file for this patient.  
Allergies:  
No Known Allergies  
Date Verified:20  
Lab Values  
Lab Value Units Date   
High Low  
POTA* 3.9 mmol/L   
2020 5.0 3.5  
HEMA* 42.4 %   
2020 46.0 36.0  
Progress Notes (NEUR   
ADULT FRVW):  
Elainexuan Bee   
2020 10:28 AM   
Signed  
Patient is scheduled   
to have the MRV today   
at 5:20PM at the main   
campus.  
Patient states that   
she has not been able   
to work yesterday or   
today due to the  
pressure being so   
high. States that is   
getting the medicine   
to bring the  
pressure down, and is   
a little better, but   
still unable to go to   
work today.  
Wonders if she can get   
a note to have   
yesterday and today   
off, she is around the  
main campus today and   
can stop by to pick it   
up.  
Elaine Gimenez PA-C   
2020 11:48 AM   
Signed  
We can write a letter   
keeping her off   
yesterday, today and   
the rest of the week.  
I have drafted a   
letter. If she can   
 on S70 today   
that would be great.  
Ghada Connor RN,   
RN 2020 12:09 PM   
Signed  
Neuro SPINE CARE   
COORDINATION QUICK   
NOTE  
LVM for patient re:   
letter.  
Printed and placed on   
S70 for pickup and   
released to pts my   
chart.  
Ghada Connor RN  
Progress Notes (RADIO   
MRI MAIN Q):  
Vilma Obando Tech 2020   
5:58 PM Signed  
Radiology Service   
Progress Note  
PATIENT NAME: Lissett Ba  
MRN: [id number]  
DATE OF SERVICE:   
2020  
TIME: 5:58 PM  
PATIENT IDENTITY   
VERIFICATION COMPLETED   
USING TWO (2)   
IDENTIFIERS: Name and  
Date of Birth   
confirmed by patient   
verbally and Name and   
Date of Birth   
confirmed  
by identification   
band.  
FALL SCREENING: Has   
the patient had 2   
falls in the last year   
or 1 fall with  
injury or currently   
using an Ambulatory   
Assistive Device   
(Walker, Cane,  
Wheelchair, Crutches,   
etc.)? No  
PATIENT GENDER DATA:   
Female. Pregnancy   
status: Pregnant: No   
Breastfeeding  
status: NO.  
PATIENT RELEVANT   
IMPLANT DATA REVIEWED:   
Yes  
RADIOLOGY DEPARTMENT:   
MR; Exam(s) Completed:   
Head: Sagittal Sinus   
MRV  
PERIPHERAL IV DATA:   
Not applicable  
SIGNED BY: Vilma Obando  
2020 5:58   
PM                  [facility]  
   
                                                    PROGRESSon 2020   
   
                                        PROGRESS            HNO ID: [id number]  
Author: Isabella Donovan)   
Pernell  
Service: ?  
Author Type: Physician   
Assistant  
Type: Progress Notes  
Filed: 2020 12:32   
PM  
Note Text:  
This note was created   
using [employer].  
Subjective  
Lissett Ba is a 25   
year old female here   
for evaluation of head   
pain,  
eye pain, visual   
disturbances and   
chiari malformation.   
Pt is c/o issues  
for over a year. She   
feels that it is   
unbearable. She has   
nausea with some  
vomiting. Pressure in   
the back of the   
throat, weight hanging   
from back of  
the head  forcing it   
down . She has this   
from the time she   
wakes up till  
she goes to bed. She   
has black spots and   
blurry vision. She has   
not been  
to the   
ophthalmologist. She   
has severe eye pain.   
She has tried imitrex,  
fioricet, diclofenac,   
tizanidine. Tylenol,   
motrin and zofran.   
Nothing is  
helping the pain.   
Lights, sleeping flat   
makes it worse. She   
feels like  
she has to keep   
moving, she can not   
lay flat.  
Review of Systems  
Constitutional:   
Negative for fatigue   
and fever.  
HENT: Positive for   
trouble swallowing.   
Negative for tinnitus.  
Eyes: Positive for   
pain and visual   
disturbance.  
Respiratory: Negative   
for cough and   
wheezing.  
Cardiovascular:   
Positive for chest   
pain. Negative for leg   
swelling.  
Gastrointestinal:   
Positive for nausea   
and vomiting.  
Endocrine: Positive   
for heat intolerance.   
Negative for cold   
intolerance.  
Genitourinary:   
Positive for frequency   
and urgency.  
Musculoskeletal:   
Positive for neck   
pain. Negative for   
gait problem.  
Allergic/Immunologic:   
Negative for   
environmental   
allergies and food  
allergies.  
Neurological: Positive   
for dizziness and   
headaches.  
Psychiatric/Behavioral  
: Negative for   
dysphoric mood. The   
patient is not  
nervous/anxious.  
Objective  
/75   Pulse 74     
Temp 36.6 ?C (97.8 ?F)   
  Resp 16   Wt 65.8 kg  
(145 lb)  
Physical Exam  
Constitutional:  
Appearance: Normal   
appearance. She is   
normal weight.  
HENT:  
Head: Normocephalic   
and atraumatic.  
Eyes:  
Extraocular Movements:   
Extraocular movements   
intact.  
Conjunctiva/sclera:   
Conjunctivae normal.  
Pulmonary:  
Effort: Pulmonary   
effort is normal. No   
respiratory distress.  
Skin:  
General: Skin is warm   
and dry.  
Neurological:  
General: No focal   
deficit present.  
Mental Status: She is   
alert and oriented to   
person, place, and   
time.  
GCS: GCS eye subscore   
is 4. GCS verbal   
subscore is 5. GCS   
motor  
subscore is 6.  
Motor: Motor function   
is intact. No weakness   
or pronator drift.  
Coordination: Romberg   
sign negative.   
Coordination normal.  
Gait: Gait and tandem   
walk normal.  
Psychiatric:  
Mood and Affect: Mood   
normal.  
Behavior: Behavior   
normal.  
Assessment and Plan  
Severe debilitating   
headaches and pressure  
Visual blackouts  
N/v.  
MRI shows 4.5 mm   
tonsillar descent  
Imaging and report   
reviewed  
CT reviewed  
Small ventricles  
Tried multiple   
medications with no   
improvement.  
Symptoms worsening.  
Recommend stat IR LP   
for OP  
Ophthalmology advised   
to try to get same day   
appt.  
MRV to evaluate for   
venous stenosis  
Diamox 250mg BID  
Pt is on toradol. May   
not be able to have LP   
until off toradol.  
Recommend First   
available appt with   
Dr. Argueta after work   
up complete.  
Pt understood.      Normal                                  [facility]  
   
                                                    Consultation Noteon 20   
   
                                        Consultation Note   [id number]  
52  
[id number]#1.0  
0CD:127             Normal                                  [facility]  
   
                                                    Insurance Correspondence Off  
iceon 2020   
   
                                                    Insurance   
Correspondence Office                   [phone]  
[id number]#1.0  
0CD:127             Normal                                  [facility]  
   
                                                    Operative Reporton   
0   
   
                                        Operative Report    [id number]  
52  
[id number]#1.0  
0CD:127             Normal                                  [facility]  
   
                                                    Ambulatory Clinical Summaryo  
n 2020   
   
                                                    Ambulatory Clinical   
Summary                                 {[id number]  
[id number]  
[id number]      Normal                                  [facility]  
   
                                                    Patient Educationon 20   
   
                                        Patient Education   Family Medicine  
Kidney Stones  
Kidney stones   
(ureteral lithiasis )   
are solid masses that   
form inside your   
kidneys. The intense   
pain is caused by the   
stone moving through   
the kidney, ureter,   
bladder, and urethra   
(urinary tract ). When   
the stone moves, the   
ureter starts to spasm   
around the stone. The   
stone is usually   
passed in the urine.  
(Inserted Image.   
Unable to display)  
HOME CARE  
? Drink enough fluids   
to keep your pee   
(urine ) clear or pale   
yellow. This helps to   
get the stone out.  
? Strain all pee   
through the provided   
strainer. Do not pee   
without peeing through   
the strainer, not even   
once. If you pee the   
stone out, catch it.   
The stone may be as   
small as a grain of   
salt. Take this to   
your doctor.  
? Only take medicine   
as told by your   
doctor.  
? Follow up with your   
doctor as told.  
? Get follow-up X-rays   
as told by your   
doctor.  
GET HELP RIGHT AWAY   
IF:  
? Your pain does not   
get better with   
medicine.  
? You have a fever.  
? Your pain increases   
and gets worse over 18   
hours.  
? You have new belly   
(abdominal ) pain.  
? You feel faint or   
pass out.  
MAKE SURE YOU:  
? Understand these   
instructions.  
? Will watch your   
condition.  
? Will get help right   
away if you are not   
doing well or get   
worse.  
Document Released:   
2009 Document   
Revised: 2013   
Document Reviewed:   
10/15/2010  
ExitCare? Patient   
Information ?   
[employer]      [facility]  
   
                                                    Urology Office/Clinic Noteon  
 2020   
   
                                                    Urology Office/Clinic   
Note                                    Chief Complaint  
New Patient/Referral   
per ZORAIDA Hayes   
and Dr. Case , F/U   
to [facility] due to rt   
2mm,4mm w/   
hydronephrosis  
HPI Staff  
New patient/ Referral   
per ZORAIDA Quintanilla,   
Dr. Case due to rt   
2 mm-4mm w/   
hydronephrosis, F/U to   
[facility] w/ [facility], CT   
scan done on 2020,   
Lab work done on   
2020,  mL  
  
Dysuria: yes pt states   
ongoing since Friday  
Incomplete bladder   
emptying: yes  
Hematuria: no UA shows   
Trace-intact  
Frequency: no mild   
intermittent pt states   
5 times per  
Urgency: no  
Nocturia: no pt states   
up to once per night   
sometimes  
Stream: pt admits to   
hesitancy/ denies stop   
and go with a varied   
stream  
Leaking: no  
Post void dripping: no  
Wearing pads/ Depends:   
no  
Urge incontinence: no  
Stress incontinence:   
no  
Abdominal pain: no  
Flank pain: no, not pt   
states not today, but   
it comes and goes, She   
states it is a sharp   
flaring pain with a   
pain scale 7-10/10  
Sexual complaints: no  
History of Present   
Illness  
There have been no   
associated fever,   
chills, flank pain or   
blood in urine.  
Reviewed CT report,   
Lab report, ER report.  
Reviewed UA.  
Review of Systems  
PHQ Score  
Initial Depression   
Screen Score: 0  
ROS - Provider  
Constitutional: denies   
weight loss, denies   
hot flashes.  
Eyes: denies eye   
problems.  
Gastrointestinal:   
denies nausea, denies   
vomiting.  
Cardiovascular: denies   
chest pain or angina.  
Integumentary: no   
dryness  
Musculoskeletal:   
denies musculoskeletal   
symptoms.  
ENMT: denies   
otolaryngeal symptoms.  
Respiratory: no   
shortness of breath.  
Heme/Lymph: denies   
easy bleeding   
tendency, denies easy   
bruising tendency.  
Psychiatric: no   
confusion, no anxiety.  
Genitourinary: denies   
vaginal discharge,   
denies incontinence,   
moderate dysuria,   
denies hematuria,   
denies urinary   
frequency, denies   
amenorrhea, denies   
menorrhagia, denies   
abnormal bleeding,   
denies pelvic pain,   
denies genital sores,   
and denies decreased   
libido.  
Physical Exam  
Vitals & Measurements  
HR: 77(Peripheral) RR:   
16 BP: 134/92  
HT: 160.0 cm HT: 160   
cm WT: 67.4 kg WT:   
67.4 kg BMI: 26.33  
General Appearance:   
alert , no acute   
distress, well   
nourished, well   
developed female.  
Head: normocephalic .  
Eyes: normal orbit and   
globe.  
ENMT: normal   
examination of   
external ears.  
Chest: Lungs CTA,   
respirations non   
labored .  
Cardiovascular:   
regular rate and   
rhythm.  
Abdomen: soft, non   
distended, no   
tenderness, no mass or   
organomegaly, no   
hernia.  
Genitourinary: bladder   
nonpalpable, no flank   
tenderness.  
Lymph Nodes:   
unremarkable palpation   
of the cervical area.  
Skin: warm, dry, no   
bruising.  
Psychiatric:   
cooperative, affect   
appropriate for age,   
normal judgement,   
euthymic mood.  
Assessment/Plan  
This patient is a   
history of right-sided   
flank pain with a 4 mm   
right distal ureteral   
calculus and a 2 mm   
lower pole calculus   
again on the right   
side. She is been   
scheduled for   
cystoscopy with   
ureteroscopic stone   
extraction. Initially   
we will treat her with   
oral medications to   
hope that she can pass   
a stone and if not we   
will proceed with   
ureteroscopic stone   
extraction.   
Medications of been   
ordered that will   
include Toradol,   
Levsin SL and   
tamsulosin.  
1. Hydronephrosis with   
ureteral calculus   
(N13.2: Hydronephrosis   
with renal and   
ureteral calculous   
obstruction)  
Ct scan report done   
20 shows 4mm stone   
in Distal Right   
ureterovesical   
junction with moderate   
hydronephrosis.  
discussed with patient   
the risks and benefits   
of removing kidney   
stone.  
will schedule cysto, R   
RG, R ureteroscopy,   
poss R stent   
placement, patient   
will call if she   
passes her stone.  
Ordered:  
Cystourethroscopy   
98164  
  
2. Kidney stone   
(N20.0: Calculus of   
kidney)  
Ct report shows 2mm   
stone in right kidney,   
non obstructing.  
Ordered:  
Cystourethroscopy   
94426  
  
3. Dysuria (R30.0:   
Dysuria)  
mild to moderate,   
ongoing since Friday.  
  
4. Microscopic   
hematuria (R31.29:   
Other microscopic   
hematuria)  
UA today shows   
Trace-intact.  
  
5. Flank pain (R10.9:   
Unspecified abdominal   
pain)  
Right side, moderate   
pain. Patient denies   
any pain today.  
Ordered:  
Cystourethroscopy   
58032  
  
Orders:  
hyoscyamine, 0.125 mg   
= 1 tab(s), Oral,   
q6hr, take 1 tablet as   
needed for spasms, #   
20 tab(s), Refills(s)   
1, [facility]: [facility]   
JAIRO 536, 160, cm,   
20 9:01:00 EDT,   
Height/Length Dosing,   
67.4, kg, 20   
8:44:00 EDT, Weight   
Dosing  
tamsulosin, 0.4 mg = 1   
cap(s), Oral, Daily, #   
10 cap(s), Refills(s)   
1, Pharmacy: [facility], 160, cm,   
20 9:01:00 EDT,   
Height/Length Dosing,   
67.4, kg, 20   
8:44:00 EDT, Weight   
Dosing  
PVR urine/bladder   
capacity/US 55920  
Urnls Dip Stick Auto   
w/o Microscopy POC   
00999  
Follow-up  
With When Contact   
Information  
Rony Marino MD, Christian MC   
[facility] [address]   
[place] [address]-   
[phone]  
Additional   
Instructions:  
Patient Education  
Kidney Stones,   
Easy-to-Read  
Documentation recorded   
by the scribe,   
Torie You,   
accurately reflects   
the services(s) I   
performed and   
decisions made by me.   
Authenticated by Dr. Uribe on 2020   
09:40:33.  
Problem List/Past   
Medical History  
Ongoing  
BMI 26.0-26.9,adult  
History of Chiari   
malformation  
Kidney stone  
Historical  
No qualifying data  
Procedure/Surgical   
History  
Adenoidectomy,   
Tonsillectomy.  
Medications  
Cipro, Oral, q12hr  
Flomax 0.4 mg Cap,   
Oral, Daily  
ketorolac 10 mg Tab,   
10 mg= 1 tab(s), Oral,   
q4hr, PRN  
Percocet 5 mg-325 mg   
oral tablet, Oral,   
q6hr  
Zofran 4 mg Tab, Oral,   
q8hr  
Zyrtec-D, Oral, BID  
Allergies  
No Known Allergies  
Social History  
Tobacco  
Never (less than 100   
in lifetime) Tobacco   
Use:. Never Smokeless   
Tobacco Use:.,   
2020  
Family History  
Breast: Grandparent.  
Colon cancer:   
Grandparent.  
Hypertension: Sister.  
Migraine: Sister.  
Lab Results  
Ambulatory Point of   
Care Results  
  
Bilirubin Urine   
Dipstick: Negative   
(20 08:38:00)  
Blood Urine Dipstick:   
Trace-intact (20   
08:38:00)  
Glucose Urine   
Dipstick: Trace 100   
mg/dl (20   
08:38:00)  
Ketones Urine   
Dipstick: Negative   
(20 08:38:00)  
Leukocytes Urine   
Dipstick: Negative   
(20 08:38:00)  
Nitrite Urine   
Dipstick: Negative   
(20 08:38:00)  
Protein Urine   
Dipstick: Negative   
(20 08:38:00)  
Specific Gravity Urine   
Dipstick: 1.025   
(20 08:38:00)  
Urine Appearance Urine   
Dipstick: Clear   
(20 08:38:00)  
Urine Color Urine   
Dipstick: Yellow   
(20 08:38:00)  
Urobilinogen Urine   
Dipstick: Normal 0.2-1   
EU/dl (20   
08:38:00)  
pH Urine Dipstick: 6   
(20 08:38:00)  
Diagnostic Results  
Reviewed urinalysis   
showing no infection.   
Reviewed CT scan   
showing right distal   
ureteral calculus and   
right renal calculus. Normal                                  [facility]  
   
                                        Comment on above:   Result Comment: Elec  
tronically Signed By: Rony Marino MD, Christian MC\.br\Date and Time Signed: 20 09:40   
EDT\.br\Electronically Co-Signed By: Debra Martinez\.br\Date and Time Co-Signed: 20 09:34 EDT   
   
                                                    OT-MRI BRAIN WO/W CON IMPORT  
on 06-   
   
                                                    OT-MRI BRAIN WO/W CON   
IMPORT                                  Images were obtained   
outside of [facility]  
[id number]  
   
                                                    CT-CT HEAD WO CON IMPORTon 0  
2020   
   
                                                    CT-CT HEAD WO CON   
IMPORT                                  Images were obtained   
outside of [facility]  
[id number]  
  
  
  
Vital Signs  
  
  
                          Date Time    Vital Sign   Value        Performing   
Clinician                               Facility  
   
                                                    2024   
09:      Body height     162.56 cm                       [facility]  
   
                                                    2024   
09:                              Body mass index (BMI)   
[Ratio]             27.3 kg/m2                              [facility]  
   
                                                    2024   
09:      Body temperature 97.8 [degF]                     [facility]  
   
                                                    2024   
09:      Body weight     72.34 kg                        [facility]  
   
                                                    2024   
09:                              Diastolic blood   
pressure            78 mm[Hg]                               [facility]  
   
                                                    2024   
09:      Heart rate      101 /min                        [facility]  
   
                                                    2024   
09:      Respiratory rate 18 /min                         [facility]  
   
                                                    2024   
09:                              SaO2% (BldA) [Mass   
fraction]           97 %                                    [facility]  
   
                                                    2024   
09:                              Systolic blood   
pressure            124 mm[Hg]                              [facility]  
   
                                                    2020   
11:      Body Temperature 97.81 [degF]    Isabella Gimenez   [facility]  
c  
   
                                                    2020   
11:      Body weight     65.77 kg        Isabella Gimenez   [facility]  
   
                                                    2020   
11:      BP Diastolic    75 mm[Hg]       Isabella Gimenez   [facility]  
   
                                                    2020   
11:      BP Systolic     119 mm[Hg]      Isabella Gimenez   [facility]  
   
                                                    2020   
11:      Pulse (Heart Rate) 74 /min         [facility]  
[facility]  
   
                                                    2020   
11:      Respiratory Rate 16 /min         [facility]  
c  
  
  
  
Encounters  
  
  
                          Encounter Date Encounter Type Care Provider Facility  
   
                                                    Start: 2024  
End: 2024     ambulatory                              [facility]  
Work Phone:   
[phone]  
   
                                                    Start: 2024  
End: 2024                         Patient encounter   
procedure                                           [facility]   
[facility]   
Moe  
Work Phone:   
[phone]  
   
                                Start: 02- Refill          Sandy Aichagusz   
NP  
Work Phone:   
[phone]                          [facility]  
   
                                        Comment on above:   Cold sore (Primary D  
x)   
   
                                                    Start: 2022  
End: 2022     ambulatory          CNP SANDY AICHHOLZ   Facility:[facility]  
   
                                                    Start: 2021  
End: 2021     ambulatory          CNP SANDY JEREMIAS   Facility:[facility]  
   
                                                    Start: 2021  
End: 2021     ambulatory          YAW GUPTA       [facility]  
   
                                                    Start: 2021  
End: 2021                         Subsequent hospital   
visit by physician        [facility]   
MRI  
   
                                        Comment on above:   Chiari I malformatio  
n (HCC)   
   
                                                    Start: 2020  
End: 2020                         Patient encounter   
procedure                               Isabella Gim[facility] (Pa)  
Work Phone:   
[phone]                          Neurosurgery  
   
                                        Comment on above:   Chiari malformation   
type I (HCC) (Primary Dx);  
Benign intracranial hypertension   
  
  
  
Procedures  
  
  
                          Date         Procedure    Procedure Detail Performing   
Clinician  
   
                          Start: 2024 Quick Strep (POC)                
   
                                        Start: 2021   Mri brain brain stem  
 w/o   
contrast material                                   Yaw Gupta APRN - CNP  
Work Phone:   
[phone]  
  
  
  
Plan of Treatment  
  
  
                          Date         Care Activity Detail       Author  
   
                          Start: 2021 Influenza vaccination Flu vaccine (#  
1) [facility]  
Work Phone:   
[phone]  
   
                                                    Start: 2021  
End: 2021                         Patient encounter   
procedure                               2021 Office   
Visit Neurosurgery   
Jacks, Yaw W, APRN -   
CNP [address]   
[place] [address]   
[phone] (Fax)                      [facility]  
   
                          Start: 2020 Influenza vaccination INFLUENZA (#1)  
 [facility]  
   
                          Start: 2016 PAP TESTING  PAP TESTING  [facility]  
   
                                        Start: 2016   Screening for malign  
ant   
neoplasm of cervix        Cervical cancer screen    [facility] Phone:   
[phone]  
   
                                        Start: 2014   DTaP/Tdap/Td vaccine  
 (2   
- Tdap)                                 DTaP/Tdap/Td vaccine   
(2 - Tdap)                              [facility]  
[employer] Phone:   
[phone]  
   
                                        Start: 2014   Urine microalbumin   
profile                                 DTAP,TDAP,TD (1 -   
Tdap)                                   [facility]  
   
                          Start: 2013 HEPATITIS C SCREENING HEPATITIS C [facility]  
   
                          Start: 2013 HIV SCREENING HIV SCREENING [facility]  
   
                          Start: 2010 HIV screening HIV screen   [facility]  
Work Phone:   
[phone]  
   
                          Start: 2007 COVID-19 Vaccine (1) COVID-19 Vaccin  
e (1) [facility]  
[employer] Phone:   
[phone]  
   
                                        Start: 2006   HPV VACCINE (1 - 2-d  
ose   
series)                                 HPV VACCINE (1 -   
2-dose series)                          [facility]  
   
                                        Start: 1996   Varicella vaccine (1  
 of   
2 - 2-dose childhood   
series)                                 Varicella vaccine (1   
of 2 - 2-dose   
childhood series)                       [facility] Phone:   
[phone]  
   
                          Start: 1995 Hepatitis C screening Hepatitis C [facility] Phone:   
[phone]  
   
                                                      
End: 2021           BASIC METABOLIC PNL       BASIC METABOLIC PNL   
Lab Routine Benign   
intracranial   
hypertension 1   
Occurrences starting   
2020 until   
2021                              [facility]  
   
                                        Comment on above:   1 Occurrences starti  
ng 2020 until 2021   
   
                                                      
End: 2021           CBC                       CBC Lab Routine Benign   
intracranial   
hypertension 1   
Occurrences starting   
2020 until   
2021                              [facility]  
   
                                        Comment on above:   1 Occurrences starti  
ng 2020 until 2021   
   
                                                            IR LP FOR DRAINAGE   
(PRESSURE)                              IR LP FOR DRAINAGE   
(PRESSURE) Radiology   
STAT Benign   
intracranial   
hypertension Ordered:   
2020                              [facility]  
   
                                        Comment on above:   Ordered: 2020   
   
                                                      
End: 2021                         Mra head w/o contrst   
material                                MRV BRAIN WO IVCON   
Radiology Routine   
Benign intracranial   
hypertension 1   
Occurrences starting   
2020 until   
2021                              [facility]  
   
                                        Comment on above:   1 Occurrences starti  
ng 2020 until 2021   
   
                                                      
End: 2021                         PRE-PROCEDURE &   
PRE-OPERATIVE COVID                     PRE-PROCEDURE &   
PRE-OPERATIVE COVID   
Microbiology Routine   
Benign intracranial   
hypertension 1   
Occurrences starting   
2020 until   
2021                              [facility]  
   
                                        Comment on above:   1 Occurrences starti  
ng 2020 until 2021   
   
                                                      
End: 2021           PROTHROMBIN TIME/PT       PROTHROMBIN TIME/PT   
Lab Routine Benign   
intracranial   
hypertension 1   
Occurrences starting   
2020 until   
2021                              [facility]  
   
                                        Comment on above:   1 Occurrences starti  
ng 2020 until 2021   
   
                                                                 [facility]  
c  
  
  
  
Payers  
  
  
                          Date         Payer Category Payer        Policy ID  
   
                                10-      Unknown         [employer]  
 PPO   
[id number] 10/1/2019-Present   
PPO                                     [id number]   
[id number]  
[id number]  
   
                          1995   Unknown                   [id number]   
[id number]  
   
                          1995   Unknown                   [id number]   
[id number]  
   
                          1995   Unknown                   [id number]   
[id number]  
   
                          1960   Unknown                   [id number]   
[id number]  
[id number]  
   
                                       Medicaid     [employer] [id number]  
[id number]   
[id number]  
[id number]  
  
  
  
Social History  
  
  
                          Date         Type         Detail       Facility  
   
                                                    Start: 2020  
End: 2024                         Tobacco smoking status   
NHIS                      Never smoker              [facility]  
   
                                                    Start: 2020  
End: 2021                         Tobacco use and   
exposure                  Never used                [facility]  
   
                                Start: 2020 Alcohol intake  Lifetime non-d  
[place]   
(finding)                               [facility]  
   
                                        Start: 2020   History SDOH Alcohol  
   
Frequency                 1                         [facility]  
   
                          Start: 1995 Sex Assigned At Birth Not on file  [facility]  
   
                                                            Exposure to SARS-CoV  
-2   
(event)                   Not sure                  [facility]  
   
                                Start: 2021 Alcohol intake  Current drinke  
r of   
alcohol (finding)                       [facility] Phone:   
[phone]  
   
                          Start: 2021 Alcohol Comment rarely       [facility]  
Work Phone:   
[phone]  
   
                                                            Tobacco smoking stat  
us   
NHIS                                    Tobacco smoking   
consumption unknown                     [facility]  
   
                                       Gender identity Not on file  [facility]  
are  
   
                          Start: 1995 Sex Assigned At Birth Female       [facility]  
[facility]  
  
  
  
Evaluation note  
  
  
                                Note Date & Type Note            Facility  
  
  
  
                                                    Evaluation note   
  
  
  
                                                    Diagnosis  
   
                                                      
  
  
Chiari I malformation (HCC)  
  
  
Compression of brain  
  
documented in this encounter  
[facility] Phone: [phone]  
  
Evaluation note  
  
  
                                Note Date & Type Note            Facility  
  
  
  
                                                    Evaluation note   
  
  
  
                                                    Diagnosis  
   
                                                      
  
  
Cold sore- Primary  
  
  
Herpes simplex without mention of complication  
  
documented in this encounter  
[facility]  
  
Evaluation note  
  
  
                                Note Date & Type Note            Facility  
  
  
  
                                                    Evaluation note   
  
  
  
                                        Diagnosis           Onset Date  
   
                          Sore throat                            noneactive  
  
  
[facility]  
Work Phone: [phone]  
  
Reason for Referral  
  
  
                          Status       Reason       Specialty    Diagnoses /   
Procedures                              Referred By   
Contact                                 Referred To   
Contact  
   
                                        Authorized            
  
  
PCP Requested   
Referral                  Ophthalmology               
  
  
Diagnoses  
  
  
Benign   
intracranial   
hypertension  
  
  
  
Procedures  
  
  
CONSULT TO   
OPHTHALMOLOGY  
  
  
NEW PATIENT VISIT   
LEVEL 5                                   
  
  
Isabella Gimenez (Pa)  
  
  
[address] [place]   
[address]  
  
  
Phone:   
[phone]  
  
  
Fax:   
[phone]                              
  
  
  
  
  
                          Status       Reason       Specialty    Diagnoses /   
Procedures                              Referred By   
Contact                                 Referred To   
Contact  
   
                                        Pending Review        
  
  
Auto-Generated   
Referral                  MR IMAGING                  
  
  
Diagnoses  
  
  
Benign   
intracranial   
hypertension  
  
  
  
Procedures  
  
  
MRV BRAIN WO IVCON  
  
  
MRA, HEAD W/O   
CONTRAST                                  
  
  
Isabella Gimenez)  
  
  
[address]   
[place]   
[address]  
  
  
Phone:   
[phone]  
  
  
Fax:   
[phone]                              
  
  
Mr Imaging  
  
  
  
                    Status    Reason    Specialty Diagnoses / Procedures Referre  
d By Contact Referred To   
Contact  
   
                          Closed                    Radiology      
  
  
Diagnoses  
  
  
Chiari I malformation   
(HCC)  
  
  
  
Procedures  
  
  
MRI CSF FLOW STUDY                        
  
  
Yaw Gupta,   
APRN - CNP  
  
  
[address]  
  
  
[place] [id number]  
  
  
Phone: [phone]  
  
  
Fax: [phone]                         
  
  
  
  
  
History of Present Illness  
* [facility]abella Gimenez ([facility]) - 2020 11:47 AM EDT  
  
This note was created using [employer].  
Subjective  
Lissett Ba is a 25 year old female here for evaluation of head pain, eye 
pain, visual disturbances and chiari malformation. Pt is c/o issues for over a 
year. She feels that it is unbearable. She has nausea with some vomiting. 
Pressure in the back of the throat, weight hanging from back of the head 
 forcing it down . She has this from the time she wakes up till she goes to bed.
 She has black spots and blurry vision. She has not been to the ophthalmologist.
 She has severe eye pain. She has tried imitrex, fioricet, diclofenac, 
tizanidine. Tylenol, motrin and zofran. Nothing is helping the pain. Lights, 
sleeping flat makes it worse. She feels like she has to keep moving, she can not
 lay flat.  
  
Review of Systems  
Constitutional: Negative for fatigue and fever.  
HENT: Positive for trouble swallowing. Negative for tinnitus.  
Eyes: Positive for pain and visual disturbance.  
Respiratory: Negative for cough and wheezing.  
Cardiovascular: Positive for chest pain. Negative for leg swelling.  
Gastrointestinal: Positive for nausea and vomiting.  
Endocrine: Positive for heat intolerance. Negative for cold intolerance.  
Genitourinary: Positive for frequency and urgency.  
Musculoskeletal: Positive for neck pain. Negative for gait problem.  
Allergic/Immunologic: Negative for environmental allergies and food allergies.  
Neurological: Positive for dizziness and headaches.  
Psychiatric/Behavioral: Negative for dysphoric mood. The patient is not 
nervous/anxious.  
  
Objective  
/75   Pulse 74   Temp 36.6 C (97.8 F)   Resp 16   Wt 65.8 kg (145 lb)  
Physical Exam  
Constitutional:  
Appearance: Normal appearance. She is normal weight.  
HENT:  
Head: Normocephalic and atraumatic.  
Eyes:  
Extraocular Movements: Extraocular movements intact.  
Conjunctiva/sclera: Conjunctivae normal.  
Pulmonary:  
Effort: Pulmonary effort is normal. No respiratory distress.  
Skin:  
General: Skin is warm and dry.  
Neurological:  
General: No focal deficit present.  
Mental Status: She is alert and oriented to person, place, and time.  
GCS: GCS eye subscore is 4. GCS verbal subscore is 5. GCS motor subscore is 6.  
Motor: Motor function is intact. No weakness or pronator drift.  
Coordination: Romberg sign negative. Coordination normal.  
Gait: Gait and tandem walk normal.  
Psychiatric:  
Mood and Affect: Mood normal.  
Behavior: Behavior normal.  
  
Assessment and Plan  
Severe debilitating headaches and pressure  
Visual blackouts  
N/v.  
MRI shows 4.5 mm tonsillar descent  
Imaging and report reviewed  
CT reviewed  
Small ventricles  
  
Tried multiple medications with no improvement.  
Symptoms worsening.  
  
Recommend stat IR LP for OP  
Ophthalmology advised to try to get same day appt.  
MRV to evaluate for venous stenosis  
Diamox 250mg BID  
  
Pt is on toradol. May not be able to have LP until off toradol.  
Recommend First available appt with Dr. Argueta after work up complete.  
Pt understood.  
  
  
  
Electronically signed by Isabella Gimenez (Pa) at 2020 12:32 PM EDT  
  
  
documented in this encounter  
  
Assessments  
  
  
                                                    Diagnosis  
   
                                                      
  
  
Chiari malformation type I (HCC)- Primary  
  
  
Compression of brain  
   
                                                      
  
  
Benign intracranial hypertension  
  
  
  
Summary Purpose  
  
  
                                                      
  
  
  
Family History  
No Family History Records FoundNo Family History Records FoundNo Family History 
Records FoundNo Family History Records Found  
  
Advance Directives  
  
  
                                Advance Directive Response        Recorded Date/  
Time  
   
                                Advance Directives No              May 17th, 202  
4 9:35am  
  
  
  
Hospital Course  
  
  
                                                    Note  
   
                                                    HNO ID: [id number] Author: TIFFANIE Luna (Cnp) Service: ? Author Type: Nurse   
Practitioner Type: Discharge Summary Filed: 2020 3:54 PM Note Text:   
--------------------------------------------------------------------------------
   
Attestation signed by Yola Moon at 9/10/2020 2:41 PM Yola Moon MD   
--------------------------------------------------------------------------------
   
DISCHARGE SUMMARY PATIENT NAME: Lissett Ba ADMISSION DATE: 2020 MRN: 
[id number]   
DISCHARGE DATE: 2020 ATTENDING PHYSICIAN: Yola Moon Code Status: Not on
 file   
Highest Readmission Risk Score: 7 The 30 day readmissions risk score is derived 
from   
an internally validated risk model which evaluates patient level 
characteristics,   
utilization history, medication orders and lab results up until the day of 
discharge.   
Patients with a score of 40 or above are considered highest risk for 
readmission.   
Specific patient level drivers will be listed at the bottom of the summary.   
CONSULTING TEAMS DURING HO (more content not included)...  
  
  
  
                                                    Note  
   
                                                    HNO ID: [id number] Author: TIFFANIE SARMIENTO (Zoraida) King Service: Radiology Author Type:  
   
Nurse Practitioner Type: Brief Op Note Filed: 2020 3:04 PM Note Text: BRIEF
   
OPERATIVE / PROCEDURE NOTE LOG ID: [id number] SURGERY/PROCEDURE DATE: 2020   
INCISION/PROCEDURE START TIME: 2:38 PM INCISION CLOSE/PROCEDURE END TIME: 2:42 
PM   
SURGEON(S)/PROCEDURALIST(S) AND ASSISTANT(S): Surgeon(s) and Role: * Naima SARMIENTO 
(Zoraida)   
King - Primary No Additional Staff SURGERY/PROCEDURE(S): Fluoroscopic guided   
diagnostic lumbar puncture ANESTHESIA: Local FINDINGS: L 3,4 accessed. Clear CSF
   
confirmed placement. OP 11 cm H20. No CSF removed. ESTIMATED BLOOD LOSS: 0 ml   
SPECIMENS: None COMPLICATIONS: None PRE-OP/PRE-PROCEDURE DIAGNOSIS: Benign   
intracranial hypertension POST-OP/POST-PROCEDURE DIAGNOSIS: Same as Preop 
SIGNATURE:   
Naima Rogers, APRN.CNP PATIENT NAME: Lissett Ba DATE: 2020 
MRN:   
[id number] TIME: 3:02 PM PAGER/CONTACT #:  
  
  
  
                                                    Note  
   
                                                    HNO ID: [id number] Author: SULMA Roberts Service: ? Author Type:   
Anesthesiologist Type: Anesthesia Procedure Notes Filed: 2020 2:34 PM Note 
Text:   
ANESTHESIOLOGY PROCEDURE NOTE Epidural Blood Patch General Information Procedure
   
Start Time/Medication Administration: 2020 1:22 PM Procedure End time: 
2020   
1:35 PM Patient location during procedure: PACU Timeout Performed Pre-procedure:
   
timeout performed Consent Obtained: Yes Patient identity confirmed: arm band, 
care   
team member and patient Reason for Blood Patch: spinal headache Staffing   
Anesthesiologist: Addy Roberts Preparation Sterility Preparation: hand   
hygiene performed prior to procedure, surgical cap used, mask used, sterile 
drape   
used during line insertion, skin prep agent completely dried prior to procedure 
Site   
Prep: Duraprep and Chloraprep (Chloraprep for Left Arm IV and duraprep for 
Epidural   
site) Procedure Details Location of venous blood draw: arm (Left A/C) Sterile: 
yes   
Volume of blood injected: 2 (more content not included)...  
  
  
  
                                                    Note  
   
                                                    HNO ID: [id number] Author: TAQUERIA Wiggins Service: ? Author Type: Anesthesiologist   
Type:   
Anesthesia Procedure Notes Filed: 2020 5:11 PM Note Text: ANESTHESIOLOGY   
PROCEDURE NOTE Epidural Blood Patch General Information Procedure Start   
Time/Medication Administration: 2020 4:50 PM Procedure End time: 2020 
5:00 PM   
Patient location during procedure: PACU Timeout Performed Pre-procedure: timeout
   
performed Consent Obtained: Yes Patient identity confirmed: arm band and care 
team   
member Reason for Blood Patch: spinal headache Staffing Anesthesiologist: Shamar Wiggins   
Preparation Sterility Preparation: hand hygiene performed prior to procedure, 
gown   
used during line insertion, surgical cap used, mask used, sterile drape used 
during   
line insertion, skin prep agent completely dried prior to procedure Site Prep:   
Chloraprep Procedure Details Location of venous blood draw: arm Sterile: yes 
Volume   
of blood injected: 20 mL Patient position: sitting Patient monitoring: Pulse OX,
 EKG   
and NIBP Appr (more content not included)...  
  
  
  
Procedure Findings  
  
  
                                                    Note  
   
                                                    HNO ID: [id number] Author: TIFFANIE SARMIENTO (Zoraida) King Service: Radiology Author Type:  
   
Nurse Practitioner Type: Brief Op Note Filed: 2020 3:04 PM Note Text: BRIEF
   
OPERATIVE / PROCEDURE NOTE LOG ID: [id number] SURGERY/PROCEDURE DATE: 2020   
INCISION/PROCEDURE START TIME: 2:38 PM INCISION CLOSE/PROCEDURE END TIME: 2:42 
PM   
SURGEON(S)/PROCEDURALIST(S) AND ASSISTANT(S): Surgeon(s) and Role: * Naima SARMIENTO 
(Cnp)   
King - Primary No Additional Staff SURGERY/PROCEDURE(S): Fluoroscopic guided   
diagnostic lumbar puncture ANESTHESIA: Local FINDINGS: L 3,4 accessed. Clear CSF
   
confirmed placement. OP 11 cm H20. No CSF removed. ESTIMATED BLOOD LOSS: 0 ml   
SPECIMENS: None COMPLICATIONS: None PRE-OP/PRE-PROCEDURE DIAGNOSIS: Benign   
intracranial hypertension POST-OP/POST-PROCEDURE DIAGNOSIS: Same as Preop 
SIGNATURE:   
Naima Rogers APRN.CNP PATIENT NAME: Lissett Ba DATE: 2020 
MRN:   
[id number] TIME: 3:02 PM PAGER/CONTACT #:  
  
  
  
                                                    Note  
   
                                                    HNO ID: [id number] Author: SULMA Roberts Service: ? Author Type:   
Anesthesiologist Type: Anesthesia Procedure Notes Filed: 2020 2:34 PM Note 
Text:   
ANESTHESIOLOGY PROCEDURE NOTE Epidural Blood Patch General Information Procedure
   
Start Time/Medication Administration: 2020 1:22 PM Procedure End time: 
2020   
1:35 PM Patient location during procedure: PACU Timeout Performed Pre-procedure:
   
timeout performed Consent Obtained: Yes Patient identity confirmed: arm band, 
care   
team member and patient Reason for Blood Patch: spinal headache Staffing   
Anesthesiologist: Addy Roberts Preparation Sterility Preparation: hand   
hygiene performed prior to procedure, surgical cap used, mask used, sterile 
drape   
used during line insertion, skin prep agent completely dried prior to procedure 
Site   
Prep: Duraprep and Chloraprep (Chloraprep for Left Arm IV and duraprep for 
Epidural   
site) Procedure Details Location of venous blood draw: arm (Left A/C) Sterile: 
yes   
Volume of blood injected: 2 (more content not included)...  
  
  
  
                                                    Note  
   
                                                    HNO ID: [id number] Author: TAQUERIA Wiggins Service: ? Author Type: Anesthesiologist   
Type:   
Anesthesia Procedure Notes Filed: 2020 5:11 PM Note Text: ANESTHESIOLOGY   
PROCEDURE NOTE Epidural Blood Patch General Information Procedure Start   
Time/Medication Administration: 2020 4:50 PM Procedure End time: 2020 
5:00 PM   
Patient location during procedure: PACU Timeout Performed Pre-procedure: timeout
   
performed Consent Obtained: Yes Patient identity confirmed: arm band and care 
team   
member Reason for Blood Patch: spinal headache Staffing Anesthesiologist: Shamar Wiggins   
Preparation Sterility Preparation: hand hygiene performed prior to procedure, 
gown   
used during line insertion, surgical cap used, mask used, sterile drape used 
during   
line insertion, skin prep agent completely dried prior to procedure Site Prep:   
Chloraprep Procedure Details Location of venous blood draw: arm Sterile: yes 
Volume   
of blood injected: 20 mL Patient position: sitting Patient monitoring: Pulse OX,
 EKG   
and NIBP Appr (more content not included)...  
  
  
  
Chief Complaint and Reason for Visit  
  
  
                                        Chief Complaint     Cough, sore throat  
   
                                        Reason for Visit    Sore throat  
  
  
  
Additional Source Comments  
  
  
  
                                                    Source Comments (unrecognize  
d section and content)  
   
                                                    In the event this informatio  
n is protected by the Federal Confidentiality of   
Alcohol   
and Drug Abuse Patient Records regulations: This information has been disclosed 
to   
you from records protected by Federal confidentiality rules (42 CFR Part 2). The
   
Federal rules prohibit you from making any further disclosure of this 
information   
unless further disclosure is expressly permitted by the written consent of the 
person   
to whom it pertains or as otherwise permitted by 42 CFR Part 2. A general   
authorization for the release of medical or other information is NOT sufficient 
for   
this purpose. The Federal rules restrict any use of the information to 
criminally   
investigate or prosecute any alcohol or drug abuse patient.[facility]  
  
  
  
  
                                                    Reason for Visit (unrecogniz  
ed section and content)  
   
                                          
  
  
  
                                        Reason              Comments  
   
                                        New Patient           
  
  
  
                    Status    Reason    Specialty Diagnoses / Procedures Referre  
d By Contact Referred To   
Contact  
   
                          Closed                    Radiology      
  
  
Diagnoses  
  
  
Chiari I malformation   
(HCC)  
  
  
  
Procedures  
  
  
MRI CSF FLOW STUDY                        
  
  
Yaw Gupta,   
APRN - CNP  
  
  
[facility] [address] [address]  
  
  
Phone: [phone]  
  
  
Fax: [phone]                         
  
  
  
  
  
  
  
                                                    INFORMATION SOURCE (unrecogn  
ized section and content)  
   
                                          
  
  
  
                                        DATE CREATED        AUTHOR  
   
                                2020                      [facility]  
  
  
  
                                DATE CREATED    AUTHOR          AUTHOR'S ORGANIZ  
ATION  
   
                                2021                      [facility]  
  
  
  
                                DATE CREATED    AUTHOR          AUTHOR'S ORGANIZ  
ATION  
   
                                08/15/2021                      [facility]  
  
  
  
                                DATE CREATED    AUTHOR          AUTHOR'S ORGANIZ  
ATION  
   
                                2022                      [facility]  
  
  
  
  
  
                                                    Care Teams (unrecognized sec  
tion and content)  
   
                                          
  
  
  
                                                    Team Status: Active   
   
                          Member       Role         Status       Dates  
   
                          Brandin Osei DO Primary Care Provider Active    
       
  
  
  
                                                    Team Status: Inactive   
   
                          Member       Role         Status       Dates  
   
                          Brandin Osei DO Primary Care Provider Active    
     Start: May 17th, 2024  
End: May 17th, 2024  
   
                          AB Pham Attending Provider Active        
 Start: May 17th, 2024  
End: May 17th, 2024  
  
  
  
  
  
                                                    Goals (unrecognized section   
and content)  
   
                                                    Goals may be documented in a  
n alternate section  
  
FOR RECORDS PERTAINING TO PATIENTS WHO ARE OR HAVE BEEN ENROLLED IN A CHEMICAL 
DEPENDENCY/SUBSTANCEABUSE PROGRAM, SOME INFORMATION MAY BE OMITTED. This 
clinical summary was aggregated from multiple sources. Caution should be 
exercised in using it in the provision of clinical care. This summary normalizes
 information from multiple sources, and as a consequence, information in this 
document may materially change the coding, format and clinical context of 
patient data. In addition, data may be omitted in some cases. CLINICAL DECISIONS
 SHOULD BE BASED ON THE PRIMARY CLINICAL RECORDS. [facility] [employer] [employer] provides
 no warranty or guarantee of the accuracy or completeness of information in this
 document.